# Patient Record
Sex: FEMALE | Race: WHITE | Employment: FULL TIME | URBAN - METROPOLITAN AREA
[De-identification: names, ages, dates, MRNs, and addresses within clinical notes are randomized per-mention and may not be internally consistent; named-entity substitution may affect disease eponyms.]

---

## 2017-02-27 PROCEDURE — 87480 CANDIDA DNA DIR PROBE: CPT | Performed by: OBSTETRICS & GYNECOLOGY

## 2017-02-27 PROCEDURE — 87510 GARDNER VAG DNA DIR PROBE: CPT | Performed by: OBSTETRICS & GYNECOLOGY

## 2017-02-27 PROCEDURE — 88175 CYTOPATH C/V AUTO FLUID REDO: CPT | Performed by: OBSTETRICS & GYNECOLOGY

## 2017-02-27 PROCEDURE — 87660 TRICHOMONAS VAGIN DIR PROBE: CPT | Performed by: OBSTETRICS & GYNECOLOGY

## 2017-04-27 PROCEDURE — 87480 CANDIDA DNA DIR PROBE: CPT | Performed by: OBSTETRICS & GYNECOLOGY

## 2017-04-27 PROCEDURE — 87102 FUNGUS ISOLATION CULTURE: CPT | Performed by: OBSTETRICS & GYNECOLOGY

## 2017-04-27 PROCEDURE — 87510 GARDNER VAG DNA DIR PROBE: CPT | Performed by: OBSTETRICS & GYNECOLOGY

## 2017-04-27 PROCEDURE — 87660 TRICHOMONAS VAGIN DIR PROBE: CPT | Performed by: OBSTETRICS & GYNECOLOGY

## 2017-07-05 PROCEDURE — 88305 TISSUE EXAM BY PATHOLOGIST: CPT | Performed by: OBSTETRICS & GYNECOLOGY

## 2017-07-05 PROCEDURE — 88312 SPECIAL STAINS GROUP 1: CPT | Performed by: OBSTETRICS & GYNECOLOGY

## 2018-09-07 PROBLEM — G56.22 CUBITAL TUNNEL SYNDROME ON LEFT: Status: ACTIVE | Noted: 2018-09-07

## 2018-09-07 PROBLEM — M75.102 ROTATOR CUFF SYNDROME OF LEFT SHOULDER: Status: ACTIVE | Noted: 2018-09-07

## 2018-10-01 PROBLEM — M25.512 ACUTE PAIN OF LEFT SHOULDER: Status: ACTIVE | Noted: 2018-10-01

## 2022-01-14 ENCOUNTER — HOSPITAL ENCOUNTER (OUTPATIENT)
Dept: ULTRASOUND IMAGING | Age: 52
Discharge: HOME | End: 2022-01-14
Attending: FAMILY MEDICINE
Payer: COMMERCIAL

## 2022-01-14 DIAGNOSIS — R19.05 PERIUMBILICAL MASS: ICD-10-CM

## 2022-01-14 PROCEDURE — 76705 ECHO EXAM OF ABDOMEN: CPT | Performed by: FAMILY MEDICINE

## 2022-01-14 PROCEDURE — 76700 US EXAM ABDOM COMPLETE: CPT | Performed by: FAMILY MEDICINE

## 2022-01-22 ENCOUNTER — HOSPITAL ENCOUNTER (OUTPATIENT)
Dept: MAMMOGRAPHY | Age: 52
Discharge: HOME | End: 2022-01-22
Attending: FAMILY MEDICINE
Payer: COMMERCIAL

## 2022-01-22 DIAGNOSIS — Z12.31 ENCOUNTER FOR SCREENING MAMMOGRAM FOR MALIGNANT NEOPLASM OF BREAST: ICD-10-CM

## 2022-01-22 PROCEDURE — 77063 BREAST TOMOSYNTHESIS BI: CPT | Performed by: FAMILY MEDICINE

## 2022-01-22 PROCEDURE — 77067 SCR MAMMO BI INCL CAD: CPT | Performed by: FAMILY MEDICINE

## 2022-04-07 ENCOUNTER — OFFICE VISIT (OUTPATIENT)
Dept: OBGYN CLINIC | Facility: CLINIC | Age: 52
End: 2022-04-07
Payer: COMMERCIAL

## 2022-04-07 VITALS
HEIGHT: 62.5 IN | DIASTOLIC BLOOD PRESSURE: 64 MMHG | WEIGHT: 168.81 LBS | SYSTOLIC BLOOD PRESSURE: 122 MMHG | BODY MASS INDEX: 30.29 KG/M2

## 2022-04-07 DIAGNOSIS — M62.89 PELVIC FLOOR DYSFUNCTION IN FEMALE: ICD-10-CM

## 2022-04-07 DIAGNOSIS — R35.0 URINARY FREQUENCY: ICD-10-CM

## 2022-04-07 DIAGNOSIS — N76.3 CHRONIC VULVITIS: ICD-10-CM

## 2022-04-07 DIAGNOSIS — Z01.411 ENCOUNTER FOR WELL WOMAN EXAM WITH ABNORMAL FINDINGS: Primary | ICD-10-CM

## 2022-04-07 DIAGNOSIS — D21.9 FIBROIDS: ICD-10-CM

## 2022-04-07 DIAGNOSIS — N39.3 SUI (STRESS URINARY INCONTINENCE, FEMALE): ICD-10-CM

## 2022-04-07 DIAGNOSIS — Z12.4 SCREENING FOR CERVICAL CANCER: ICD-10-CM

## 2022-04-07 DIAGNOSIS — Z12.31 ENCOUNTER FOR SCREENING MAMMOGRAM FOR MALIGNANT NEOPLASM OF BREAST: ICD-10-CM

## 2022-04-07 LAB
APPEARANCE: CLEAR
BILIRUB UR QL STRIP.AUTO: NEGATIVE
BILIRUBIN: NEGATIVE
COLOR UR AUTO: YELLOW
CYTOLOGY CVX/VAG DOC THIN PREP: NORMAL
GLUCOSE (URINE DIPSTICK): NEGATIVE MG/DL
GLUCOSE UR STRIP.AUTO-MCNC: NEGATIVE MG/DL
HPV16+18+45 E6+E7MRNA CVX NAA+PROBE: NEGATIVE
KETONES (URINE DIPSTICK): NEGATIVE MG/DL
KETONES UR STRIP.AUTO-MCNC: NEGATIVE MG/DL
LEUKOCYTE ESTERASE UR QL STRIP.AUTO: NEGATIVE
LEUKOCYTES: NEGATIVE
MULTISTIX LOT#: NORMAL NUMERIC
NITRITE UR QL STRIP.AUTO: NEGATIVE
NITRITE, URINE: NEGATIVE
OCCULT BLOOD: NEGATIVE
PH UR STRIP.AUTO: 7 [PH] (ref 5–8)
PH, URINE: 7 (ref 4.5–8)
PROT UR STRIP.AUTO-MCNC: NEGATIVE MG/DL
PROTEIN (URINE DIPSTICK): NEGATIVE MG/DL
RBC UR QL AUTO: NEGATIVE
SP GR UR STRIP.AUTO: 1.02 (ref 1–1.03)
SPECIFIC GRAVITY: 1.02 (ref 1–1.03)
URINE-COLOR: YELLOW
UROBILINOGEN UR STRIP.AUTO-MCNC: <2 MG/DL
UROBILINOGEN,SEMI-QN: 0.2 MG/DL (ref 0–1.9)

## 2022-04-07 PROCEDURE — 3078F DIAST BP <80 MM HG: CPT | Performed by: OBSTETRICS & GYNECOLOGY

## 2022-04-07 PROCEDURE — 81001 URINALYSIS AUTO W/SCOPE: CPT | Performed by: OBSTETRICS & GYNECOLOGY

## 2022-04-07 PROCEDURE — 99204 OFFICE O/P NEW MOD 45 MIN: CPT | Performed by: OBSTETRICS & GYNECOLOGY

## 2022-04-07 PROCEDURE — 87510 GARDNER VAG DNA DIR PROBE: CPT | Performed by: OBSTETRICS & GYNECOLOGY

## 2022-04-07 PROCEDURE — 81002 URINALYSIS NONAUTO W/O SCOPE: CPT | Performed by: OBSTETRICS & GYNECOLOGY

## 2022-04-07 PROCEDURE — 87624 HPV HI-RISK TYP POOLED RSLT: CPT | Performed by: OBSTETRICS & GYNECOLOGY

## 2022-04-07 PROCEDURE — 87086 URINE CULTURE/COLONY COUNT: CPT | Performed by: OBSTETRICS & GYNECOLOGY

## 2022-04-07 PROCEDURE — 87660 TRICHOMONAS VAGIN DIR PROBE: CPT | Performed by: OBSTETRICS & GYNECOLOGY

## 2022-04-07 PROCEDURE — 99386 PREV VISIT NEW AGE 40-64: CPT | Performed by: OBSTETRICS & GYNECOLOGY

## 2022-04-07 PROCEDURE — 3008F BODY MASS INDEX DOCD: CPT | Performed by: OBSTETRICS & GYNECOLOGY

## 2022-04-07 PROCEDURE — 87480 CANDIDA DNA DIR PROBE: CPT | Performed by: OBSTETRICS & GYNECOLOGY

## 2022-04-07 PROCEDURE — 3074F SYST BP LT 130 MM HG: CPT | Performed by: OBSTETRICS & GYNECOLOGY

## 2022-04-07 NOTE — PATIENT INSTRUCTIONS
Vulvar care basics:    Cleaning:  -use plain warm (not hot) water (no soap) to wash if needed or can take Sitz bath (see below)   -use water, fingers, & only very gentle, fragrance-free, moisturizing cleanser       (e.g. Cetaphil or CeraVe hydrating or gentle cleansers meant for eczema/psoriasis & faces)  -only pat dry gently (no rubbing)    Sitz baths:  -soothing and cleansing  -[facility] a S[facility] Bath from [facility] or [facility]--fits over toilet, you can fill with water to soak vulva without having to get in bathtub  -Use 1-3 times per day for ten minutes at a time  -Pat dry, apply thin layer of vaseline to protect the skin    Protection/Prevention:  -goal is to maintain an intact, moist (non-dehydrated) skin barrier   -need to prevent irritation & over-drying of skin that can lead to micro-tears, cracking, and itching, pain, & bleeding.   -do not scratch or wipe hard (mechanical injury)   -avoid strong chemicals/fragrances (fragrance free soap & detergents, avoid fabric softeners)  -avoid other irritants (e.g. sweat, leaked urine, leaked stool)   -avoid excessive friction (no thongs, always use lubricant for intercourse, daily barrier cream or ointment)   -breathable underwear, change underwear if sweaty/wet, no underwear while sleeping if possible. -DO USE a barrier product for protection        (e.g. Aquaphor, vaseline, A&D ointment, Zinc oxide, diaper rash cream) at least once daily  -DO NOT use any instrument (including fingers) in the anus first and then in the vagina. This will cause a bacterial infection of the vagina.      Treatment:  -BEST TREATMENT IS PREVENTION   -ointments are generally more potent than creams  -use just a thin layer  -during treatment (usually at least 2 wk) it is best to avoid intercourse to avoid trauma to the skin   -if diabetic or prone to yeast infections:          Start with internal (vaginal) AND external (vulvar) therapy         Monistat 7 vaginally AND Lotrimin (clotrimazole) twice daily at minimum 14-28 days          Both Monistat & Lotrimin are over the counter - talk to pharmacist if you need help         Often chronic therapy with clotrimazole or Nystatin (prescription) will be needed   -if diagnosed with chronic inflammatory skin condition (e.g. lichen sclerosis)          Most important is avoiding scratching & irritants         Work with gynecologist to form a steroid regimen for long term use         Often will need daily strong topical steroid (prescription) for 6-12 weeks. Best to try to taper down to 2-3 times per week or weekly if able & can increase use in a flare  -if sensitive/dry skin         Add back some oils &/or moisture on a regular basis         Coconut oil is pure (no irritants) & contains ceramides (fatty acids) that are              important for maintaining skin barrier         Hyaluronic acid (there are suppositories for intravaginal use e.g Revaree)         Vaginal moisturizer products can be used topically as well (e.g. Replens, Kourtney Latin)          Still recommend a barrier product in addition (on top of the above)     If you are tempted to scratch:  -place ice pack on area for 15-20 minutes & distract yourself.   -if needed can (sparingly) place a thin layer of lidocaine ointment or cream         (e.g. Bikini-zone, etc over the counter)  -can take an oral anti-histamine (e.g. Benadryl, Claritin, Zyrtec, etc)   -can also use a topical steroid (hydrocortisone ointment over the counter) for a few days        Take care - chronic steroid use can cause skin thinning & can worsen your problem. Lubricants    Aloe Cadabra  Good Clean Love  Restore  Pre-Seed (targeted for those attempting to conceive)     Please remember that if your condition is not improving, you may need a skin biopsy or   to see a dermatologist. Skin cancer can itch too, so we should always make sure we are   looking out/thinking about that as a possibility as well. Good luck!        The Role of Physical Therapy in the Treatment of Pelvic Floor Dysfunction:    Physical therapists are trained to evaluate and treat dysfunctions in the joints, muscles, nerves and scar. Physical therapists specifically trained in the area of pelvic health can identify the possible musculoskeletal causes of pelvic pain, bladder and bowel difficulties and develop a treatment plan specific to the individual suffering from this difficulties. What to expect at your first physical therapy appointment:   Your first visit will include an initial evaluation in a comfortable, private room by a therapist who has undergone advanced education and training in the evaluation and treatment of pelvic muscle dysfunction. The therapist will obtain a detailed history of your health, pain and activity limitations. She will also ask you about any bowel, bladder and sexual difficulties as these are in part controlled by the pelvic muscles. The therapist will then take a look at your posture, mobility of your spine and hips and strength and flexibility of pelvic girdle muscles. She will examine any scar tissue and trigger points in the muscles of your pelvic region. The therapist will also specifically examine the pelvic floor muscles. Your pelvic floor consists of a group of muscles that attach behind the pubic bone in the front to the tail bone in the back. They are responsible for providing support to the pelvic joints and organs, relaxing to allow the passage of urine, stool and gas and martha to prevent the loss of urine, stool and gas as appropriate. In order to best examine these muscles you will be asked to undress from the waist down and be covered with a sheet. The therapist will use a lubricated, gloved finger to identify painful muscles around and in your vagina or rectum then instruct you to contract and relax these muscles in order to determine how the muscles are functioning.  Care is taken to make you as comfortable as possible with the exam.     Your therapist will discuss the evaluation results with you and provide you with education regarding your specific condition and the expectation of therapy. She will answer all of your questions and will work with you to establish a treatment plan based on the results of the evaluation and your goals for therapy. THE [facility] Pelvic Floor Physical Therapy    [address], [address] [facility] [place] [address], [address] [facility]. Ph: [phone]  [address], [facility], 2nd floor. [facility]. Ph: [phone] & [phone]  [address], [place], [address] [facility]. Ph [phone]  [address], [address]. Ph [phone]  [address]. Ph [phone]  [address] [place] [address].  Ph [phone]

## 2022-04-08 LAB — HPV I/H RISK 1 DNA SPEC QL NAA+PROBE: NEGATIVE

## 2022-04-11 NOTE — PROGRESS NOTES
Left message to call office back for test results  Renetta Lundy been trying to reach you but unable to do so. Your lab results are normal. Because of some vaginal irritation, Dr. Kodak Bailey recommends for you can try some yeast cream or ointment over the counter (Monistat 7) for 2-4 weeks to see if this helps with symptoms but mainly need to get rid of the irritants. Avoid panty liners, this can promote irritant and bacteria growth. A barrier ointment (vaseline) can be very helpful to protect the skin from these irritants. You can use warm water or some dove soap at times, no vaginal or vulvar moisturizers, pat dry usually. Let us know that you received this message.      Let us know if you have futher questions,  BONITA Alonso

## 2022-05-04 ENCOUNTER — TELEPHONE (OUTPATIENT)
Dept: PHYSICAL THERAPY | Facility: HOSPITAL | Age: 52
End: 2022-05-04

## 2022-05-05 ENCOUNTER — TELEPHONE (OUTPATIENT)
Dept: PHYSICAL THERAPY | Facility: HOSPITAL | Age: 52
End: 2022-05-05

## 2022-05-09 ENCOUNTER — APPOINTMENT (OUTPATIENT)
Dept: PHYSICAL THERAPY | Facility: HOSPITAL | Age: 52
End: 2022-05-09
Attending: OBSTETRICS & GYNECOLOGY
Payer: COMMERCIAL

## 2022-05-16 ENCOUNTER — APPOINTMENT (OUTPATIENT)
Dept: PHYSICAL THERAPY | Facility: HOSPITAL | Age: 52
End: 2022-05-16
Attending: OBSTETRICS & GYNECOLOGY
Payer: COMMERCIAL

## 2022-05-23 ENCOUNTER — APPOINTMENT (OUTPATIENT)
Dept: PHYSICAL THERAPY | Facility: HOSPITAL | Age: 52
End: 2022-05-23
Attending: OBSTETRICS & GYNECOLOGY
Payer: COMMERCIAL

## 2022-05-24 PROBLEM — R45.851 SUICIDAL IDEATION: Status: ACTIVE | Noted: 2022-05-24

## 2022-05-24 PROBLEM — R39.15 URINARY URGENCY: Status: ACTIVE | Noted: 2022-05-24

## 2022-05-24 NOTE — TELEPHONE ENCOUNTER
Received phone call from [email or web address] officer: pt is doing fine, was watching TV, eating salad, denies suicidal thoughts or harming herself, pt feels safe. Pt feels that she needs an appointment, she needs to be seen for her menopause and hormones.

## 2022-05-24 NOTE — TELEPHONE ENCOUNTER
Left message to call office back. Pt msg relayed to [id number] [facility] and EP advised to call for wellness check. RN called [facility] police dept spoke to Teja regarding pt's message: suicidal thoughts, contact, address given, last visit, current medication. Police dept will call back with update.

## 2022-05-24 NOTE — TELEPHONE ENCOUNTER
Spoke to pt. She feels safe, does not plan to harm herself. If she has suicidal thoughts to call [phone], they are available 24/7. Denies suicidal thoughts at this time. She said it only happened twice. Denies s/s UTI, Some days she feels like she has to go to bathroom q30-60 mins. She's been watching youtube videos on pelvic floor PT. Finds it hard to follow without going to PT. Pt may try relaxing music to help relax her pelvic muscle. Will call pt with recommendation once her notes are reviewed by Dr. Consuelo Peck. Pt. Verb understanding.

## 2022-05-24 NOTE — TELEPHONE ENCOUNTER
She is describing urgency. At her visit she was talking to me about leaking urine with coughing for years. That is different. Needs to see PCP. Needs to have urine checked for urinary tract infection with PCP. Needs to do pelvic floor PT. Can consider going on an overactive bladder medication per PCP if she wants  She can also see urogynecology if she wants. She needs to see her PCP about her mood. Having suicidal thoughts is not a typical perimenopause symptom. Something else is going on.

## 2022-05-24 NOTE — TELEPHONE ENCOUNTER
From: Chica Gamez  To: Chelsie Brooks MD  Sent: 5/24/2022 12:13 PM CDT  Subject: Urinary Incontinence     I have not done my pelvic floor PT yet because my insurance doesn't cover it, so I am looking at extra insurance that would start June 1st. I just want you to know of my extra symptoms. So this urinary incontinence you spoke to me has gotten worse. It's awful. I have to pee every hour it seems. I know one night I woke up every hour to use the bathroom. I certainly hope that PT will help that because I can't live like this. Which brings me to my other symptom of suicidal thoughts. I have had two times after our visit of suicidal thoughts. I have not had those since getting over my ex .    Thanks,  [place]

## 2022-05-26 NOTE — TELEPHONE ENCOUNTER
LM to call back or leave Expert TA message. Third call attempt made, Letter sent through mail [phone].

## 2022-06-01 ENCOUNTER — APPOINTMENT (OUTPATIENT)
Dept: PHYSICAL THERAPY | Facility: HOSPITAL | Age: 52
End: 2022-06-01
Attending: OBSTETRICS & GYNECOLOGY
Payer: COMMERCIAL

## 2022-06-06 ENCOUNTER — APPOINTMENT (OUTPATIENT)
Dept: PHYSICAL THERAPY | Facility: HOSPITAL | Age: 52
End: 2022-06-06
Attending: OBSTETRICS & GYNECOLOGY
Payer: COMMERCIAL

## 2022-06-13 ENCOUNTER — APPOINTMENT (OUTPATIENT)
Dept: PHYSICAL THERAPY | Facility: HOSPITAL | Age: 52
End: 2022-06-13
Attending: OBSTETRICS & GYNECOLOGY
Payer: COMMERCIAL

## 2022-06-20 ENCOUNTER — APPOINTMENT (OUTPATIENT)
Dept: PHYSICAL THERAPY | Facility: HOSPITAL | Age: 52
End: 2022-06-20
Attending: OBSTETRICS & GYNECOLOGY
Payer: COMMERCIAL

## 2022-06-27 ENCOUNTER — APPOINTMENT (OUTPATIENT)
Dept: PHYSICAL THERAPY | Facility: HOSPITAL | Age: 52
End: 2022-06-27
Attending: OBSTETRICS & GYNECOLOGY
Payer: COMMERCIAL

## 2022-07-06 ENCOUNTER — APPOINTMENT (OUTPATIENT)
Dept: PHYSICAL THERAPY | Facility: HOSPITAL | Age: 52
End: 2022-07-06
Attending: OBSTETRICS & GYNECOLOGY
Payer: COMMERCIAL

## 2022-07-11 ENCOUNTER — APPOINTMENT (OUTPATIENT)
Dept: PHYSICAL THERAPY | Facility: HOSPITAL | Age: 52
End: 2022-07-11
Attending: OBSTETRICS & GYNECOLOGY
Payer: COMMERCIAL

## 2022-08-22 ENCOUNTER — TELEPHONE (OUTPATIENT)
Dept: PHYSICAL THERAPY | Facility: HOSPITAL | Age: 52
End: 2022-08-22

## 2022-09-09 ENCOUNTER — HOSPITAL ENCOUNTER (OUTPATIENT)
Dept: ULTRASOUND IMAGING | Age: 52
Discharge: HOME | End: 2022-09-09
Attending: OBSTETRICS & GYNECOLOGY
Payer: COMMERCIAL

## 2022-09-09 DIAGNOSIS — Z12.31 ENCOUNTER FOR SCREENING MAMMOGRAM FOR MALIGNANT NEOPLASM OF BREAST: ICD-10-CM

## 2022-09-09 PROCEDURE — 76856 US EXAM PELVIC COMPLETE: CPT | Performed by: OBSTETRICS & GYNECOLOGY

## 2022-09-09 PROCEDURE — 76830 TRANSVAGINAL US NON-OB: CPT | Performed by: OBSTETRICS & GYNECOLOGY

## 2022-09-16 ENCOUNTER — TELEPHONE (OUTPATIENT)
Dept: OBGYN CLINIC | Facility: CLINIC | Age: 52
End: 2022-09-16

## 2022-10-12 ENCOUNTER — TELEPHONE (OUTPATIENT)
Dept: PHYSICAL THERAPY | Facility: HOSPITAL | Age: 52
End: 2022-10-12

## 2022-10-13 ENCOUNTER — OFFICE VISIT (OUTPATIENT)
Dept: PHYSICAL THERAPY | Facility: HOSPITAL | Age: 52
End: 2022-10-13
Attending: OBSTETRICS & GYNECOLOGY
Payer: COMMERCIAL

## 2022-10-13 DIAGNOSIS — R35.0 URINARY FREQUENCY: ICD-10-CM

## 2022-10-13 DIAGNOSIS — N39.3 SUI (STRESS URINARY INCONTINENCE, FEMALE): ICD-10-CM

## 2022-10-13 DIAGNOSIS — M62.89 PELVIC FLOOR DYSFUNCTION IN FEMALE: ICD-10-CM

## 2022-10-13 PROCEDURE — 97112 NEUROMUSCULAR REEDUCATION: CPT

## 2022-10-13 PROCEDURE — 97161 PT EVAL LOW COMPLEX 20 MIN: CPT

## 2022-10-13 PROCEDURE — 97110 THERAPEUTIC EXERCISES: CPT

## 2022-10-18 ENCOUNTER — OFFICE VISIT (OUTPATIENT)
Dept: PHYSICAL THERAPY | Facility: HOSPITAL | Age: 52
End: 2022-10-18
Attending: OBSTETRICS & GYNECOLOGY
Payer: COMMERCIAL

## 2022-10-18 DIAGNOSIS — N39.3 SUI (STRESS URINARY INCONTINENCE, FEMALE): ICD-10-CM

## 2022-10-18 DIAGNOSIS — M62.89 PELVIC FLOOR DYSFUNCTION IN FEMALE: ICD-10-CM

## 2022-10-18 DIAGNOSIS — R35.0 URINARY FREQUENCY: ICD-10-CM

## 2022-10-18 PROCEDURE — 97110 THERAPEUTIC EXERCISES: CPT

## 2022-10-18 PROCEDURE — 97112 NEUROMUSCULAR REEDUCATION: CPT

## 2022-10-26 ENCOUNTER — OFFICE VISIT (OUTPATIENT)
Dept: PHYSICAL THERAPY | Facility: HOSPITAL | Age: 52
End: 2022-10-26
Attending: OBSTETRICS & GYNECOLOGY
Payer: COMMERCIAL

## 2022-10-26 DIAGNOSIS — N39.3 SUI (STRESS URINARY INCONTINENCE, FEMALE): ICD-10-CM

## 2022-10-26 DIAGNOSIS — M62.89 PELVIC FLOOR DYSFUNCTION IN FEMALE: ICD-10-CM

## 2022-10-26 DIAGNOSIS — R35.0 URINARY FREQUENCY: ICD-10-CM

## 2022-10-26 PROCEDURE — 97112 NEUROMUSCULAR REEDUCATION: CPT

## 2022-10-26 PROCEDURE — 97110 THERAPEUTIC EXERCISES: CPT

## 2022-10-31 ENCOUNTER — OFFICE VISIT (OUTPATIENT)
Dept: PHYSICAL THERAPY | Facility: HOSPITAL | Age: 52
End: 2022-10-31
Attending: OBSTETRICS & GYNECOLOGY
Payer: COMMERCIAL

## 2022-10-31 DIAGNOSIS — R35.0 URINARY FREQUENCY: ICD-10-CM

## 2022-10-31 DIAGNOSIS — M62.89 PELVIC FLOOR DYSFUNCTION IN FEMALE: ICD-10-CM

## 2022-10-31 DIAGNOSIS — N39.3 SUI (STRESS URINARY INCONTINENCE, FEMALE): ICD-10-CM

## 2022-10-31 PROCEDURE — 97112 NEUROMUSCULAR REEDUCATION: CPT

## 2022-10-31 PROCEDURE — 97110 THERAPEUTIC EXERCISES: CPT

## 2022-11-09 ENCOUNTER — OFFICE VISIT (OUTPATIENT)
Dept: PHYSICAL THERAPY | Facility: HOSPITAL | Age: 52
End: 2022-11-09
Attending: OBSTETRICS & GYNECOLOGY
Payer: COMMERCIAL

## 2022-11-09 DIAGNOSIS — M62.89 PELVIC FLOOR DYSFUNCTION IN FEMALE: ICD-10-CM

## 2022-11-09 DIAGNOSIS — R35.0 URINARY FREQUENCY: ICD-10-CM

## 2022-11-09 DIAGNOSIS — N39.3 SUI (STRESS URINARY INCONTINENCE, FEMALE): ICD-10-CM

## 2022-11-09 PROCEDURE — 97112 NEUROMUSCULAR REEDUCATION: CPT

## 2022-11-09 PROCEDURE — 97110 THERAPEUTIC EXERCISES: CPT

## 2022-11-16 ENCOUNTER — APPOINTMENT (OUTPATIENT)
Dept: PHYSICAL THERAPY | Facility: HOSPITAL | Age: 52
End: 2022-11-16
Attending: OBSTETRICS & GYNECOLOGY
Payer: COMMERCIAL

## 2022-11-23 ENCOUNTER — OFFICE VISIT (OUTPATIENT)
Dept: PHYSICAL THERAPY | Facility: HOSPITAL | Age: 52
End: 2022-11-23
Attending: OBSTETRICS & GYNECOLOGY
Payer: COMMERCIAL

## 2022-11-23 PROCEDURE — 97112 NEUROMUSCULAR REEDUCATION: CPT

## 2022-11-23 PROCEDURE — 97110 THERAPEUTIC EXERCISES: CPT

## 2022-11-30 ENCOUNTER — OFFICE VISIT (OUTPATIENT)
Dept: PHYSICAL THERAPY | Facility: HOSPITAL | Age: 52
End: 2022-11-30
Attending: OBSTETRICS & GYNECOLOGY
Payer: COMMERCIAL

## 2022-11-30 DIAGNOSIS — M62.89 PELVIC FLOOR DYSFUNCTION IN FEMALE: ICD-10-CM

## 2022-11-30 DIAGNOSIS — R35.0 URINARY FREQUENCY: ICD-10-CM

## 2022-11-30 DIAGNOSIS — N39.3 SUI (STRESS URINARY INCONTINENCE, FEMALE): ICD-10-CM

## 2022-11-30 PROCEDURE — 97112 NEUROMUSCULAR REEDUCATION: CPT

## 2022-11-30 PROCEDURE — 97110 THERAPEUTIC EXERCISES: CPT

## 2022-12-07 ENCOUNTER — OFFICE VISIT (OUTPATIENT)
Dept: PHYSICAL THERAPY | Facility: HOSPITAL | Age: 52
End: 2022-12-07
Attending: OBSTETRICS & GYNECOLOGY
Payer: COMMERCIAL

## 2022-12-07 DIAGNOSIS — R35.0 URINARY FREQUENCY: ICD-10-CM

## 2022-12-07 DIAGNOSIS — M62.89 PELVIC FLOOR DYSFUNCTION IN FEMALE: ICD-10-CM

## 2022-12-07 DIAGNOSIS — N39.3 SUI (STRESS URINARY INCONTINENCE, FEMALE): ICD-10-CM

## 2022-12-07 PROCEDURE — 97110 THERAPEUTIC EXERCISES: CPT

## 2022-12-07 PROCEDURE — 97112 NEUROMUSCULAR REEDUCATION: CPT

## 2022-12-08 ENCOUNTER — LAB ENCOUNTER (OUTPATIENT)
Dept: LAB | Age: 52
End: 2022-12-08
Attending: FAMILY MEDICINE
Payer: COMMERCIAL

## 2022-12-08 DIAGNOSIS — Z00.00 ROUTINE GENERAL MEDICAL EXAMINATION AT A HEALTH CARE FACILITY: Primary | ICD-10-CM

## 2022-12-08 LAB
ALBUMIN SERPL-MCNC: 3.8 G/DL (ref 3.4–5)
ALBUMIN/GLOB SERPL: 1.2 {RATIO} (ref 1–2)
ALP LIVER SERPL-CCNC: 51 U/L
ALT SERPL-CCNC: 23 U/L
ANION GAP SERPL CALC-SCNC: 5 MMOL/L (ref 0–18)
AST SERPL-CCNC: 23 U/L (ref 15–37)
BASOPHILS # BLD AUTO: 0.02 X10(3) UL (ref 0–0.2)
BASOPHILS NFR BLD AUTO: 0.3 %
BILIRUB SERPL-MCNC: 0.7 MG/DL (ref 0.1–2)
BILIRUB UR QL STRIP.AUTO: NEGATIVE
BUN BLD-MCNC: 10 MG/DL (ref 7–18)
CALCIUM BLD-MCNC: 8.8 MG/DL (ref 8.5–10.1)
CHLORIDE SERPL-SCNC: 109 MMOL/L (ref 98–112)
CHOLEST SERPL-MCNC: 211 MG/DL (ref ?–200)
CLARITY UR REFRACT.AUTO: CLEAR
CO2 SERPL-SCNC: 25 MMOL/L (ref 21–32)
COLOR UR AUTO: YELLOW
CREAT BLD-MCNC: 0.77 MG/DL
EOSINOPHIL # BLD AUTO: 0.08 X10(3) UL (ref 0–0.7)
EOSINOPHIL NFR BLD AUTO: 1.3 %
ERYTHROCYTE [DISTWIDTH] IN BLOOD BY AUTOMATED COUNT: 12.7 %
EST. AVERAGE GLUCOSE BLD GHB EST-MCNC: 114 MG/DL (ref 68–126)
FASTING PATIENT LIPID ANSWER: YES
FASTING STATUS PATIENT QL REPORTED: YES
GFR SERPLBLD BASED ON 1.73 SQ M-ARVRAT: 94 ML/MIN/1.73M2 (ref 60–?)
GLOBULIN PLAS-MCNC: 3.1 G/DL (ref 2.8–4.4)
GLUCOSE BLD-MCNC: 87 MG/DL (ref 70–99)
GLUCOSE UR STRIP.AUTO-MCNC: NEGATIVE MG/DL
HBA1C MFR BLD: 5.6 % (ref ?–5.7)
HCT VFR BLD AUTO: 40 %
HDLC SERPL-MCNC: 72 MG/DL (ref 40–59)
HGB BLD-MCNC: 13.3 G/DL
IMM GRANULOCYTES # BLD AUTO: 0.02 X10(3) UL (ref 0–1)
IMM GRANULOCYTES NFR BLD: 0.3 %
KETONES UR STRIP.AUTO-MCNC: NEGATIVE MG/DL
LDLC SERPL CALC-MCNC: 121 MG/DL (ref ?–100)
LEUKOCYTE ESTERASE UR QL STRIP.AUTO: NEGATIVE
LYMPHOCYTES # BLD AUTO: 2.93 X10(3) UL (ref 1–4)
LYMPHOCYTES NFR BLD AUTO: 48.6 %
MCH RBC QN AUTO: 31.7 PG (ref 26–34)
MCHC RBC AUTO-ENTMCNC: 33.3 G/DL (ref 31–37)
MCV RBC AUTO: 95.5 FL
MONOCYTES # BLD AUTO: 0.68 X10(3) UL (ref 0.1–1)
MONOCYTES NFR BLD AUTO: 11.3 %
NEUTROPHILS # BLD AUTO: 2.3 X10 (3) UL (ref 1.5–7.7)
NEUTROPHILS # BLD AUTO: 2.3 X10(3) UL (ref 1.5–7.7)
NEUTROPHILS NFR BLD AUTO: 38.2 %
NITRITE UR QL STRIP.AUTO: NEGATIVE
NONHDLC SERPL-MCNC: 139 MG/DL (ref ?–130)
OSMOLALITY SERPL CALC.SUM OF ELEC: 286 MOSM/KG (ref 275–295)
PH UR STRIP.AUTO: 6.5 [PH] (ref 5–8)
PLATELET # BLD AUTO: 289 10(3)UL (ref 150–450)
POTASSIUM SERPL-SCNC: 4.6 MMOL/L (ref 3.5–5.1)
PROT SERPL-MCNC: 6.9 G/DL (ref 6.4–8.2)
PROT UR STRIP.AUTO-MCNC: NEGATIVE MG/DL
RBC # BLD AUTO: 4.19 X10(6)UL
RBC UR QL AUTO: NEGATIVE
SODIUM SERPL-SCNC: 139 MMOL/L (ref 136–145)
SP GR UR STRIP.AUTO: 1.02 (ref 1–1.03)
TRIGL SERPL-MCNC: 103 MG/DL (ref 30–149)
TSI SER-ACNC: 1.38 MIU/ML (ref 0.36–3.74)
URATE SERPL-MCNC: 4.1 MG/DL
UROBILINOGEN UR STRIP.AUTO-MCNC: 0.2 MG/DL
VLDLC SERPL CALC-MCNC: 18 MG/DL (ref 0–30)
WBC # BLD AUTO: 6 X10(3) UL (ref 4–11)

## 2022-12-08 PROCEDURE — 80053 COMPREHEN METABOLIC PANEL: CPT

## 2022-12-08 PROCEDURE — 81003 URINALYSIS AUTO W/O SCOPE: CPT

## 2022-12-08 PROCEDURE — 84550 ASSAY OF BLOOD/URIC ACID: CPT

## 2022-12-08 PROCEDURE — 84443 ASSAY THYROID STIM HORMONE: CPT

## 2022-12-08 PROCEDURE — 80061 LIPID PANEL: CPT

## 2022-12-08 PROCEDURE — 36415 COLL VENOUS BLD VENIPUNCTURE: CPT

## 2022-12-08 PROCEDURE — 83036 HEMOGLOBIN GLYCOSYLATED A1C: CPT

## 2022-12-08 PROCEDURE — 85025 COMPLETE CBC W/AUTO DIFF WBC: CPT

## 2022-12-14 ENCOUNTER — OFFICE VISIT (OUTPATIENT)
Dept: PHYSICAL THERAPY | Facility: HOSPITAL | Age: 52
End: 2022-12-14
Attending: OBSTETRICS & GYNECOLOGY
Payer: COMMERCIAL

## 2022-12-14 DIAGNOSIS — M62.89 PELVIC FLOOR DYSFUNCTION IN FEMALE: ICD-10-CM

## 2022-12-14 DIAGNOSIS — R35.0 URINARY FREQUENCY: ICD-10-CM

## 2022-12-14 DIAGNOSIS — N39.3 SUI (STRESS URINARY INCONTINENCE, FEMALE): ICD-10-CM

## 2022-12-14 PROCEDURE — 97112 NEUROMUSCULAR REEDUCATION: CPT

## 2022-12-14 PROCEDURE — 97110 THERAPEUTIC EXERCISES: CPT

## 2022-12-21 ENCOUNTER — APPOINTMENT (OUTPATIENT)
Dept: PHYSICAL THERAPY | Facility: HOSPITAL | Age: 52
End: 2022-12-21
Attending: OBSTETRICS & GYNECOLOGY
Payer: COMMERCIAL

## 2023-01-10 ENCOUNTER — OFFICE VISIT (OUTPATIENT)
Dept: PHYSICAL THERAPY | Facility: HOSPITAL | Age: 53
End: 2023-01-10
Attending: OBSTETRICS & GYNECOLOGY
Payer: COMMERCIAL

## 2023-01-10 PROCEDURE — 97112 NEUROMUSCULAR REEDUCATION: CPT

## 2023-01-10 PROCEDURE — 97110 THERAPEUTIC EXERCISES: CPT

## 2023-03-01 ENCOUNTER — APPOINTMENT (OUTPATIENT)
Dept: PHYSICAL THERAPY | Facility: HOSPITAL | Age: 53
End: 2023-03-01
Attending: OBSTETRICS & GYNECOLOGY
Payer: COMMERCIAL

## 2023-03-14 ENCOUNTER — HOSPITAL ENCOUNTER (OUTPATIENT)
Dept: MAMMOGRAPHY | Age: 53
Discharge: HOME | End: 2023-03-14
Attending: OBSTETRICS & GYNECOLOGY
Payer: COMMERCIAL

## 2023-03-14 DIAGNOSIS — Z12.31 ENCOUNTER FOR SCREENING MAMMOGRAM FOR MALIGNANT NEOPLASM OF BREAST: ICD-10-CM

## 2023-03-14 LAB — HM MAMMOGRAPHY BILATERAL: NORMAL

## 2023-03-14 PROCEDURE — 77067 SCR MAMMO BI INCL CAD: CPT | Performed by: OBSTETRICS & GYNECOLOGY

## 2023-03-14 PROCEDURE — 77063 BREAST TOMOSYNTHESIS BI: CPT | Performed by: OBSTETRICS & GYNECOLOGY

## 2023-03-17 NOTE — PROGRESS NOTES
Message left to call back. I also let her know that Dr. Ree Corea sent her a [employer] message with results & recommendations.

## 2023-03-20 ENCOUNTER — TELEPHONE (OUTPATIENT)
Facility: CLINIC | Age: 53
End: 2023-03-20

## 2023-07-26 ENCOUNTER — CLINICAL ABSTRACT (OUTPATIENT)
Dept: FAMILY MEDICINE | Age: 53
End: 2023-07-26

## 2023-07-26 ENCOUNTER — OFFICE VISIT (OUTPATIENT)
Dept: FAMILY MEDICINE | Age: 53
End: 2023-07-26

## 2023-07-26 VITALS
RESPIRATION RATE: 16 BRPM | TEMPERATURE: 98.3 F | HEART RATE: 92 BPM | DIASTOLIC BLOOD PRESSURE: 93 MMHG | HEIGHT: 63 IN | SYSTOLIC BLOOD PRESSURE: 153 MMHG | BODY MASS INDEX: 31.21 KG/M2 | WEIGHT: 176.15 LBS

## 2023-07-26 DIAGNOSIS — E78.2 MIXED HYPERLIPIDEMIA: ICD-10-CM

## 2023-07-26 DIAGNOSIS — Z00.01 ENCOUNTER FOR GENERAL ADULT MEDICAL EXAMINATION WITH ABNORMAL FINDINGS: Primary | ICD-10-CM

## 2023-07-26 DIAGNOSIS — Z12.11 SCREEN FOR COLON CANCER: ICD-10-CM

## 2023-07-26 DIAGNOSIS — Z23 NEED FOR SHINGLES VACCINE: ICD-10-CM

## 2023-07-26 DIAGNOSIS — E03.9 HYPOTHYROIDISM, UNSPECIFIED TYPE: ICD-10-CM

## 2023-07-26 DIAGNOSIS — Z00.00 LABORATORY EXAM ORDERED AS PART OF ROUTINE GENERAL MEDICAL EXAMINATION: ICD-10-CM

## 2023-07-26 DIAGNOSIS — M77.12 LATERAL EPICONDYLITIS OF BOTH ELBOWS: ICD-10-CM

## 2023-07-26 DIAGNOSIS — I10 PRIMARY HYPERTENSION: ICD-10-CM

## 2023-07-26 DIAGNOSIS — M77.11 LATERAL EPICONDYLITIS OF BOTH ELBOWS: ICD-10-CM

## 2023-07-26 PROBLEM — N39.3 SUI (STRESS URINARY INCONTINENCE, FEMALE): Status: ACTIVE | Noted: 2022-04-07

## 2023-07-26 PROCEDURE — 3077F SYST BP >= 140 MM HG: CPT | Performed by: FAMILY MEDICINE

## 2023-07-26 PROCEDURE — 99386 PREV VISIT NEW AGE 40-64: CPT | Performed by: FAMILY MEDICINE

## 2023-07-26 PROCEDURE — 3080F DIAST BP >= 90 MM HG: CPT | Performed by: FAMILY MEDICINE

## 2023-07-26 PROCEDURE — 99214 OFFICE O/P EST MOD 30 MIN: CPT | Performed by: FAMILY MEDICINE

## 2023-07-26 RX ORDER — LOSARTAN POTASSIUM 100 MG/1
100 TABLET ORAL DAILY
Qty: 30 TABLET | Refills: 0 | Status: SHIPPED | OUTPATIENT
Start: 2023-07-26

## 2023-07-26 RX ORDER — LOVASTATIN 20 MG/1
20 TABLET ORAL DAILY
Qty: 90 TABLET | Refills: 1 | Status: SHIPPED | OUTPATIENT
Start: 2023-07-26

## 2023-07-26 RX ORDER — LOSARTAN POTASSIUM 50 MG/1
50 TABLET ORAL DAILY
COMMUNITY
Start: 2023-04-27 | End: 2023-07-26 | Stop reason: DRUGHIGH

## 2023-07-26 RX ORDER — LOSARTAN POTASSIUM 50 MG/1
50 TABLET ORAL DAILY
Qty: 90 TABLET | Refills: 0 | Status: CANCELLED | OUTPATIENT
Start: 2023-07-26

## 2023-07-26 RX ORDER — LEVOTHYROXINE SODIUM 0.12 MG/1
125 TABLET ORAL DAILY
COMMUNITY
Start: 2023-07-12 | End: 2023-07-26 | Stop reason: SDUPTHER

## 2023-07-26 RX ORDER — LOVASTATIN 20 MG/1
20 TABLET ORAL DAILY
COMMUNITY
Start: 2023-06-05 | End: 2023-07-26 | Stop reason: SDUPTHER

## 2023-07-26 RX ORDER — LOSARTAN POTASSIUM 100 MG/1
100 TABLET ORAL DAILY
Qty: 90 TABLET | OUTPATIENT
Start: 2023-07-26

## 2023-07-26 RX ORDER — LEVOTHYROXINE SODIUM 0.12 MG/1
125 TABLET ORAL DAILY
Qty: 90 TABLET | Refills: 1 | Status: SHIPPED | OUTPATIENT
Start: 2023-07-26

## 2023-07-26 ASSESSMENT — PATIENT HEALTH QUESTIONNAIRE - PHQ9
SUM OF ALL RESPONSES TO PHQ9 QUESTIONS 1 AND 2: 0
2. FEELING DOWN, DEPRESSED OR HOPELESS: NOT AT ALL
SUM OF ALL RESPONSES TO PHQ9 QUESTIONS 1 AND 2: 0
1. LITTLE INTEREST OR PLEASURE IN DOING THINGS: NOT AT ALL
CLINICAL INTERPRETATION OF PHQ2 SCORE: NO FURTHER SCREENING NEEDED

## 2023-07-31 PROBLEM — M25.522 BILATERAL ELBOW JOINT PAIN: Status: ACTIVE | Noted: 2023-07-31

## 2023-07-31 PROBLEM — M25.521 BILATERAL ELBOW JOINT PAIN: Status: ACTIVE | Noted: 2023-07-31

## 2023-07-31 ASSESSMENT — ENCOUNTER SYMPTOMS
QUALITY: DISCOMFORT
QUALITY: ACHE

## 2023-08-01 ENCOUNTER — OFFICE VISIT (OUTPATIENT)
Dept: OCCUPATIONAL THERAPY | Age: 53
End: 2023-08-01
Attending: FAMILY MEDICINE

## 2023-08-01 DIAGNOSIS — M77.12 LATERAL EPICONDYLITIS OF BOTH ELBOWS: ICD-10-CM

## 2023-08-01 DIAGNOSIS — M77.11 LATERAL EPICONDYLITIS OF BOTH ELBOWS: ICD-10-CM

## 2023-08-01 PROCEDURE — 97165 OT EVAL LOW COMPLEX 30 MIN: CPT | Performed by: OCCUPATIONAL THERAPIST

## 2023-08-01 PROCEDURE — 97110 THERAPEUTIC EXERCISES: CPT | Performed by: OCCUPATIONAL THERAPIST

## 2023-08-01 PROCEDURE — 97140 MANUAL THERAPY 1/> REGIONS: CPT | Performed by: OCCUPATIONAL THERAPIST

## 2023-08-01 ASSESSMENT — ENCOUNTER SYMPTOMS
PAIN SCALE AT LOWEST: 0
ALLEVIATING FACTORS: ICE
ALLEVIATING FACTORS: AVOIDING MOVEMENT IN INVOLVED AREA
PAIN SCALE AT HIGHEST: 7
ALLEVIATING FACTORS: REST
PAIN SEVERITY NOW: 0
ALLEVIATING FACTOR: TEMPORARY RELIEF
PAIN FREQUENCY: INTERMITTENT

## 2023-08-11 ENCOUNTER — OFFICE VISIT (OUTPATIENT)
Dept: OCCUPATIONAL THERAPY | Age: 53
End: 2023-08-11
Attending: FAMILY MEDICINE

## 2023-08-11 DIAGNOSIS — M25.521 BILATERAL ELBOW JOINT PAIN: Primary | ICD-10-CM

## 2023-08-11 DIAGNOSIS — M25.522 BILATERAL ELBOW JOINT PAIN: Primary | ICD-10-CM

## 2023-08-11 PROCEDURE — 97110 THERAPEUTIC EXERCISES: CPT | Performed by: OCCUPATIONAL THERAPIST

## 2023-08-11 PROCEDURE — 97112 NEUROMUSCULAR REEDUCATION: CPT | Performed by: OCCUPATIONAL THERAPIST

## 2023-08-11 PROCEDURE — 97140 MANUAL THERAPY 1/> REGIONS: CPT | Performed by: OCCUPATIONAL THERAPIST

## 2023-08-11 PROCEDURE — 97530 THERAPEUTIC ACTIVITIES: CPT | Performed by: OCCUPATIONAL THERAPIST

## 2023-08-18 ENCOUNTER — OFFICE VISIT (OUTPATIENT)
Dept: OCCUPATIONAL THERAPY | Age: 53
End: 2023-08-18
Attending: FAMILY MEDICINE

## 2023-08-18 DIAGNOSIS — M25.522 BILATERAL ELBOW JOINT PAIN: Primary | ICD-10-CM

## 2023-08-18 DIAGNOSIS — M25.521 BILATERAL ELBOW JOINT PAIN: Primary | ICD-10-CM

## 2023-08-18 PROCEDURE — 97110 THERAPEUTIC EXERCISES: CPT | Performed by: OCCUPATIONAL THERAPIST

## 2023-08-18 PROCEDURE — 97530 THERAPEUTIC ACTIVITIES: CPT | Performed by: OCCUPATIONAL THERAPIST

## 2023-08-18 PROCEDURE — 97140 MANUAL THERAPY 1/> REGIONS: CPT | Performed by: OCCUPATIONAL THERAPIST

## 2023-08-18 PROCEDURE — 97112 NEUROMUSCULAR REEDUCATION: CPT | Performed by: OCCUPATIONAL THERAPIST

## 2023-08-23 ENCOUNTER — APPOINTMENT (OUTPATIENT)
Dept: FAMILY MEDICINE | Age: 53
End: 2023-08-23

## 2023-08-25 ENCOUNTER — OFFICE VISIT (OUTPATIENT)
Dept: OCCUPATIONAL THERAPY | Age: 53
End: 2023-08-25
Attending: FAMILY MEDICINE

## 2023-08-25 DIAGNOSIS — M25.521 BILATERAL ELBOW JOINT PAIN: Primary | ICD-10-CM

## 2023-08-25 DIAGNOSIS — M25.522 BILATERAL ELBOW JOINT PAIN: Primary | ICD-10-CM

## 2023-08-25 PROCEDURE — 97110 THERAPEUTIC EXERCISES: CPT | Performed by: OCCUPATIONAL THERAPIST

## 2023-08-25 PROCEDURE — 97140 MANUAL THERAPY 1/> REGIONS: CPT | Performed by: OCCUPATIONAL THERAPIST

## 2023-08-25 PROCEDURE — 97530 THERAPEUTIC ACTIVITIES: CPT | Performed by: OCCUPATIONAL THERAPIST

## 2023-08-25 PROCEDURE — 97112 NEUROMUSCULAR REEDUCATION: CPT | Performed by: OCCUPATIONAL THERAPIST

## 2023-08-31 ENCOUNTER — OFFICE VISIT (OUTPATIENT)
Dept: FAMILY MEDICINE CLINIC | Facility: CLINIC | Age: 53
End: 2023-08-31
Payer: COMMERCIAL

## 2023-08-31 VITALS
HEART RATE: 95 BPM | SYSTOLIC BLOOD PRESSURE: 140 MMHG | OXYGEN SATURATION: 97 % | DIASTOLIC BLOOD PRESSURE: 90 MMHG | RESPIRATION RATE: 16 BRPM | BODY MASS INDEX: 30.86 KG/M2 | WEIGHT: 172 LBS | HEIGHT: 62.5 IN

## 2023-08-31 DIAGNOSIS — E03.8 OTHER SPECIFIED HYPOTHYROIDISM: ICD-10-CM

## 2023-08-31 DIAGNOSIS — E78.2 MIXED HYPERLIPIDEMIA: ICD-10-CM

## 2023-08-31 DIAGNOSIS — I10 PRIMARY HYPERTENSION: Primary | ICD-10-CM

## 2023-08-31 DIAGNOSIS — Z23 NEED FOR VACCINATION: ICD-10-CM

## 2023-08-31 DIAGNOSIS — Z12.11 SCREENING FOR COLON CANCER: ICD-10-CM

## 2023-08-31 PROBLEM — M25.522 BILATERAL ELBOW JOINT PAIN: Status: ACTIVE | Noted: 2023-07-31

## 2023-08-31 PROBLEM — M25.521 BILATERAL ELBOW JOINT PAIN: Status: ACTIVE | Noted: 2023-07-31

## 2023-08-31 RX ORDER — LOSARTAN POTASSIUM 100 MG/1
100 TABLET ORAL DAILY
COMMUNITY
Start: 2023-07-26 | End: 2023-08-31

## 2023-08-31 RX ORDER — LOVASTATIN 20 MG/1
20 TABLET ORAL DAILY
Qty: 90 TABLET | Refills: 1 | Status: SHIPPED | OUTPATIENT
Start: 2023-08-31

## 2023-08-31 RX ORDER — LEVOTHYROXINE SODIUM 0.12 MG/1
TABLET ORAL
COMMUNITY

## 2023-08-31 RX ORDER — LOSARTAN POTASSIUM 100 MG/1
100 TABLET ORAL DAILY
Qty: 90 TABLET | Refills: 1 | Status: SHIPPED | OUTPATIENT
Start: 2023-08-31

## 2023-08-31 RX ORDER — AMLODIPINE BESYLATE 5 MG/1
5 TABLET ORAL DAILY
Qty: 30 TABLET | Refills: 0 | Status: SHIPPED | OUTPATIENT
Start: 2023-08-31

## 2023-09-06 ENCOUNTER — OFFICE VISIT (OUTPATIENT)
Dept: OCCUPATIONAL THERAPY | Age: 53
End: 2023-09-06
Attending: FAMILY MEDICINE

## 2023-09-06 DIAGNOSIS — M25.521 BILATERAL ELBOW JOINT PAIN: Primary | ICD-10-CM

## 2023-09-06 DIAGNOSIS — M25.522 BILATERAL ELBOW JOINT PAIN: Primary | ICD-10-CM

## 2023-09-06 PROCEDURE — 97112 NEUROMUSCULAR REEDUCATION: CPT | Performed by: OCCUPATIONAL THERAPIST

## 2023-09-06 PROCEDURE — 97110 THERAPEUTIC EXERCISES: CPT | Performed by: OCCUPATIONAL THERAPIST

## 2023-09-06 PROCEDURE — 97140 MANUAL THERAPY 1/> REGIONS: CPT | Performed by: OCCUPATIONAL THERAPIST

## 2023-09-06 PROCEDURE — 97530 THERAPEUTIC ACTIVITIES: CPT | Performed by: OCCUPATIONAL THERAPIST

## 2023-09-27 ENCOUNTER — TELEPHONE (OUTPATIENT)
Dept: FAMILY MEDICINE CLINIC | Facility: CLINIC | Age: 53
End: 2023-09-27

## 2023-10-06 ENCOUNTER — LABORATORY ENCOUNTER (OUTPATIENT)
Dept: LAB | Age: 53
End: 2023-10-06
Attending: STUDENT IN AN ORGANIZED HEALTH CARE EDUCATION/TRAINING PROGRAM
Payer: COMMERCIAL

## 2023-10-06 LAB
ANION GAP SERPL CALC-SCNC: 4 MMOL/L (ref 0–18)
BUN BLD-MCNC: 14 MG/DL (ref 7–18)
CALCIUM BLD-MCNC: 9 MG/DL (ref 8.5–10.1)
CHLORIDE SERPL-SCNC: 104 MMOL/L (ref 98–112)
CHOLEST SERPL-MCNC: 162 MG/DL (ref ?–200)
CO2 SERPL-SCNC: 27 MMOL/L (ref 21–32)
CREAT BLD-MCNC: 0.74 MG/DL
EGFRCR SERPLBLD CKD-EPI 2021: 98 ML/MIN/1.73M2 (ref 60–?)
FASTING PATIENT LIPID ANSWER: YES
FASTING STATUS PATIENT QL REPORTED: YES
GLUCOSE BLD-MCNC: 90 MG/DL (ref 70–99)
HDLC SERPL-MCNC: 65 MG/DL (ref 40–59)
LDLC SERPL CALC-MCNC: 81 MG/DL (ref ?–100)
NONHDLC SERPL-MCNC: 97 MG/DL (ref ?–130)
OSMOLALITY SERPL CALC.SUM OF ELEC: 280 MOSM/KG (ref 275–295)
POTASSIUM SERPL-SCNC: 3.8 MMOL/L (ref 3.5–5.1)
SODIUM SERPL-SCNC: 135 MMOL/L (ref 136–145)
T4 FREE SERPL-MCNC: 1.4 NG/DL (ref 0.8–1.7)
TRIGL SERPL-MCNC: 84 MG/DL (ref 30–149)
TSI SER-ACNC: 0.42 MIU/ML (ref 0.36–3.74)
VLDLC SERPL CALC-MCNC: 13 MG/DL (ref 0–30)

## 2023-10-06 PROCEDURE — 80048 BASIC METABOLIC PNL TOTAL CA: CPT | Performed by: STUDENT IN AN ORGANIZED HEALTH CARE EDUCATION/TRAINING PROGRAM

## 2023-10-06 PROCEDURE — 84439 ASSAY OF FREE THYROXINE: CPT | Performed by: STUDENT IN AN ORGANIZED HEALTH CARE EDUCATION/TRAINING PROGRAM

## 2023-10-06 PROCEDURE — 80061 LIPID PANEL: CPT | Performed by: STUDENT IN AN ORGANIZED HEALTH CARE EDUCATION/TRAINING PROGRAM

## 2023-10-06 PROCEDURE — 84443 ASSAY THYROID STIM HORMONE: CPT | Performed by: STUDENT IN AN ORGANIZED HEALTH CARE EDUCATION/TRAINING PROGRAM

## 2023-11-07 ENCOUNTER — OFFICE VISIT (OUTPATIENT)
Dept: FAMILY MEDICINE CLINIC | Facility: CLINIC | Age: 53
End: 2023-11-07
Payer: COMMERCIAL

## 2023-11-07 VITALS
HEIGHT: 62.5 IN | SYSTOLIC BLOOD PRESSURE: 130 MMHG | WEIGHT: 172 LBS | DIASTOLIC BLOOD PRESSURE: 80 MMHG | RESPIRATION RATE: 18 BRPM | OXYGEN SATURATION: 97 % | BODY MASS INDEX: 30.86 KG/M2 | HEART RATE: 90 BPM

## 2023-11-07 DIAGNOSIS — Z23 NEED FOR VACCINATION: ICD-10-CM

## 2023-11-07 DIAGNOSIS — M79.604 RIGHT LEG PAIN: ICD-10-CM

## 2023-11-07 DIAGNOSIS — I10 PRIMARY HYPERTENSION: Primary | ICD-10-CM

## 2023-11-07 DIAGNOSIS — M54.50 RIGHT LUMBAR PAIN: ICD-10-CM

## 2023-11-07 PROCEDURE — 3079F DIAST BP 80-89 MM HG: CPT | Performed by: STUDENT IN AN ORGANIZED HEALTH CARE EDUCATION/TRAINING PROGRAM

## 2023-11-07 PROCEDURE — 90471 IMMUNIZATION ADMIN: CPT | Performed by: STUDENT IN AN ORGANIZED HEALTH CARE EDUCATION/TRAINING PROGRAM

## 2023-11-07 PROCEDURE — 99213 OFFICE O/P EST LOW 20 MIN: CPT | Performed by: STUDENT IN AN ORGANIZED HEALTH CARE EDUCATION/TRAINING PROGRAM

## 2023-11-07 PROCEDURE — 3075F SYST BP GE 130 - 139MM HG: CPT | Performed by: STUDENT IN AN ORGANIZED HEALTH CARE EDUCATION/TRAINING PROGRAM

## 2023-11-07 PROCEDURE — 3008F BODY MASS INDEX DOCD: CPT | Performed by: STUDENT IN AN ORGANIZED HEALTH CARE EDUCATION/TRAINING PROGRAM

## 2023-11-07 PROCEDURE — 90750 HZV VACC RECOMBINANT IM: CPT | Performed by: STUDENT IN AN ORGANIZED HEALTH CARE EDUCATION/TRAINING PROGRAM

## 2023-11-07 RX ORDER — AMLODIPINE BESYLATE 5 MG/1
5 TABLET ORAL DAILY
Qty: 90 TABLET | Refills: 0 | Status: SHIPPED | OUTPATIENT
Start: 2023-11-07

## 2023-11-07 NOTE — PATIENT INSTRUCTIONS
Take your blood pressure once in the morning and once in the evening for at least 7 days. Take your evening blood pressure at least 30 min or later after you take your evening Losartan. If your average blood pressure is close to 120/80 then this is good. If however your average blood pressure is at 140/90 or above, start taking the amlodipine 5 mg tablet once a day in the mornings. If you start taking amlodipine, check your morning blood pressure 30 min or later after you take the amlodipine in the morning and your evening blood pressure 30 min or later after the losartan.    Again the goal is as close to 120/80 as possible, but if you still find that the blood pressure is at 140/90 or above we can increase the dosage of the amlodipine (just let me know by calling the clinic or sending me a [employer] message)

## 2023-12-08 ENCOUNTER — TELEPHONE (OUTPATIENT)
Dept: GASTROENTEROLOGY | Age: 53
End: 2023-12-08

## 2024-02-21 DIAGNOSIS — I10 PRIMARY HYPERTENSION: ICD-10-CM

## 2024-02-21 DIAGNOSIS — E78.2 MIXED HYPERLIPIDEMIA: ICD-10-CM

## 2024-02-21 RX ORDER — LOSARTAN POTASSIUM 100 MG/1
100 TABLET ORAL DAILY
Qty: 90 TABLET | Refills: 1 | Status: SHIPPED | OUTPATIENT
Start: 2024-02-21

## 2024-02-21 NOTE — TELEPHONE ENCOUNTER
Last office visit: 11/7/2023   Protocol: pass  Requested medication(s) are due for refill today: yes  Requested medication(s) are on the active medication list same strength, form, dose/ sig: yes  Requested medication(s) are managed by provider: yes  Patient has already received a courtsey refill: no    NOV: 2/29/2024

## 2024-02-22 RX ORDER — LOVASTATIN 20 MG/1
20 TABLET ORAL DAILY
Qty: 90 TABLET | Refills: 1 | Status: SHIPPED | OUTPATIENT
Start: 2024-02-22

## 2024-02-29 ENCOUNTER — OFFICE VISIT (OUTPATIENT)
Dept: FAMILY MEDICINE CLINIC | Facility: CLINIC | Age: 54
End: 2024-02-29
Payer: COMMERCIAL

## 2024-02-29 VITALS
HEART RATE: 72 BPM | WEIGHT: 166 LBS | DIASTOLIC BLOOD PRESSURE: 82 MMHG | BODY MASS INDEX: 27.33 KG/M2 | OXYGEN SATURATION: 96 % | HEIGHT: 65.5 IN | SYSTOLIC BLOOD PRESSURE: 120 MMHG

## 2024-02-29 DIAGNOSIS — E03.8 OTHER SPECIFIED HYPOTHYROIDISM: ICD-10-CM

## 2024-02-29 DIAGNOSIS — M21.619 BUNION: ICD-10-CM

## 2024-02-29 DIAGNOSIS — Z12.4 CERVICAL CANCER SCREENING: ICD-10-CM

## 2024-02-29 DIAGNOSIS — M72.2 PLANTAR FASCIITIS: ICD-10-CM

## 2024-02-29 DIAGNOSIS — Z00.00 ANNUAL PHYSICAL EXAM: Primary | ICD-10-CM

## 2024-02-29 DIAGNOSIS — Z23 NEED FOR VACCINATION: ICD-10-CM

## 2024-02-29 DIAGNOSIS — Z00.00 LABORATORY EXAM ORDERED AS PART OF ROUTINE GENERAL MEDICAL EXAMINATION: ICD-10-CM

## 2024-02-29 DIAGNOSIS — Z80.9 MATERNAL FAMILY HISTORY OF CANCER: ICD-10-CM

## 2024-02-29 DIAGNOSIS — L71.9 ROSACEA: ICD-10-CM

## 2024-02-29 LAB
ALBUMIN SERPL-MCNC: 3.8 G/DL (ref 3.4–5)
ALBUMIN/GLOB SERPL: 1 {RATIO} (ref 1–2)
ALP LIVER SERPL-CCNC: 50 U/L
ALT SERPL-CCNC: 17 U/L
ANION GAP SERPL CALC-SCNC: 4 MMOL/L (ref 0–18)
AST SERPL-CCNC: 24 U/L (ref 15–37)
BASOPHILS # BLD AUTO: 0.03 X10(3) UL (ref 0–0.2)
BASOPHILS NFR BLD AUTO: 0.6 %
BILIRUB SERPL-MCNC: 0.9 MG/DL (ref 0.1–2)
BUN BLD-MCNC: 15 MG/DL (ref 9–23)
CALCIUM BLD-MCNC: 9.3 MG/DL (ref 8.5–10.1)
CHLORIDE SERPL-SCNC: 110 MMOL/L (ref 98–112)
CHOLEST SERPL-MCNC: 166 MG/DL (ref ?–200)
CO2 SERPL-SCNC: 22 MMOL/L (ref 21–32)
CREAT BLD-MCNC: 0.76 MG/DL
EGFRCR SERPLBLD CKD-EPI 2021: 94 ML/MIN/1.73M2 (ref 60–?)
EOSINOPHIL # BLD AUTO: 0.05 X10(3) UL (ref 0–0.7)
EOSINOPHIL NFR BLD AUTO: 0.9 %
ERYTHROCYTE [DISTWIDTH] IN BLOOD BY AUTOMATED COUNT: 12.1 %
EST. AVERAGE GLUCOSE BLD GHB EST-MCNC: 114 MG/DL (ref 68–126)
FASTING PATIENT LIPID ANSWER: YES
FASTING STATUS PATIENT QL REPORTED: YES
GLOBULIN PLAS-MCNC: 3.9 G/DL (ref 2.8–4.4)
GLUCOSE BLD-MCNC: 106 MG/DL (ref 70–99)
HBA1C MFR BLD: 5.6 % (ref ?–5.7)
HCT VFR BLD AUTO: 40.3 %
HDLC SERPL-MCNC: 62 MG/DL (ref 40–59)
HGB BLD-MCNC: 13.5 G/DL
IMM GRANULOCYTES # BLD AUTO: 0.02 X10(3) UL (ref 0–1)
IMM GRANULOCYTES NFR BLD: 0.4 %
LDLC SERPL CALC-MCNC: 89 MG/DL (ref ?–100)
LYMPHOCYTES # BLD AUTO: 2.26 X10(3) UL (ref 1–4)
LYMPHOCYTES NFR BLD AUTO: 42.2 %
MCH RBC QN AUTO: 30.3 PG (ref 26–34)
MCHC RBC AUTO-ENTMCNC: 33.5 G/DL (ref 31–37)
MCV RBC AUTO: 90.6 FL
MONOCYTES # BLD AUTO: 0.56 X10(3) UL (ref 0.1–1)
MONOCYTES NFR BLD AUTO: 10.5 %
NEUTROPHILS # BLD AUTO: 2.43 X10 (3) UL (ref 1.5–7.7)
NEUTROPHILS # BLD AUTO: 2.43 X10(3) UL (ref 1.5–7.7)
NEUTROPHILS NFR BLD AUTO: 45.4 %
NONHDLC SERPL-MCNC: 104 MG/DL (ref ?–130)
OSMOLALITY SERPL CALC.SUM OF ELEC: 283 MOSM/KG (ref 275–295)
PLATELET # BLD AUTO: 269 10(3)UL (ref 150–450)
POTASSIUM SERPL-SCNC: 4.4 MMOL/L (ref 3.5–5.1)
PROT SERPL-MCNC: 7.7 G/DL (ref 6.4–8.2)
RBC # BLD AUTO: 4.45 X10(6)UL
SODIUM SERPL-SCNC: 136 MMOL/L (ref 136–145)
TRIGL SERPL-MCNC: 77 MG/DL (ref 30–149)
VLDLC SERPL CALC-MCNC: 12 MG/DL (ref 0–30)
WBC # BLD AUTO: 5.4 X10(3) UL (ref 4–11)

## 2024-02-29 PROCEDURE — 3074F SYST BP LT 130 MM HG: CPT | Performed by: STUDENT IN AN ORGANIZED HEALTH CARE EDUCATION/TRAINING PROGRAM

## 2024-02-29 PROCEDURE — 90686 IIV4 VACC NO PRSV 0.5 ML IM: CPT | Performed by: STUDENT IN AN ORGANIZED HEALTH CARE EDUCATION/TRAINING PROGRAM

## 2024-02-29 PROCEDURE — 99396 PREV VISIT EST AGE 40-64: CPT | Performed by: STUDENT IN AN ORGANIZED HEALTH CARE EDUCATION/TRAINING PROGRAM

## 2024-02-29 PROCEDURE — 84443 ASSAY THYROID STIM HORMONE: CPT | Performed by: STUDENT IN AN ORGANIZED HEALTH CARE EDUCATION/TRAINING PROGRAM

## 2024-02-29 PROCEDURE — 80061 LIPID PANEL: CPT | Performed by: STUDENT IN AN ORGANIZED HEALTH CARE EDUCATION/TRAINING PROGRAM

## 2024-02-29 PROCEDURE — 85025 COMPLETE CBC W/AUTO DIFF WBC: CPT | Performed by: STUDENT IN AN ORGANIZED HEALTH CARE EDUCATION/TRAINING PROGRAM

## 2024-02-29 PROCEDURE — G0438 PPPS, INITIAL VISIT: HCPCS | Performed by: STUDENT IN AN ORGANIZED HEALTH CARE EDUCATION/TRAINING PROGRAM

## 2024-02-29 PROCEDURE — 90471 IMMUNIZATION ADMIN: CPT | Performed by: STUDENT IN AN ORGANIZED HEALTH CARE EDUCATION/TRAINING PROGRAM

## 2024-02-29 PROCEDURE — 83036 HEMOGLOBIN GLYCOSYLATED A1C: CPT | Performed by: STUDENT IN AN ORGANIZED HEALTH CARE EDUCATION/TRAINING PROGRAM

## 2024-02-29 PROCEDURE — 3079F DIAST BP 80-89 MM HG: CPT | Performed by: STUDENT IN AN ORGANIZED HEALTH CARE EDUCATION/TRAINING PROGRAM

## 2024-02-29 PROCEDURE — 84439 ASSAY OF FREE THYROXINE: CPT | Performed by: STUDENT IN AN ORGANIZED HEALTH CARE EDUCATION/TRAINING PROGRAM

## 2024-02-29 PROCEDURE — 90472 IMMUNIZATION ADMIN EACH ADD: CPT | Performed by: STUDENT IN AN ORGANIZED HEALTH CARE EDUCATION/TRAINING PROGRAM

## 2024-02-29 PROCEDURE — 3008F BODY MASS INDEX DOCD: CPT | Performed by: STUDENT IN AN ORGANIZED HEALTH CARE EDUCATION/TRAINING PROGRAM

## 2024-02-29 PROCEDURE — 90750 HZV VACC RECOMBINANT IM: CPT | Performed by: STUDENT IN AN ORGANIZED HEALTH CARE EDUCATION/TRAINING PROGRAM

## 2024-02-29 PROCEDURE — 80053 COMPREHEN METABOLIC PANEL: CPT | Performed by: STUDENT IN AN ORGANIZED HEALTH CARE EDUCATION/TRAINING PROGRAM

## 2024-02-29 NOTE — PROGRESS NOTES
[facility] Family Medicine Office Note    HPI:     Indiana Coello is a 54 year old female presenting for annual exam.     Patient has hx of rosacea and needs renewal of derm referral.     Patient has hx of bunions and plantar fasciitis and would like new podiatry referral.     Patient has HMO insurance and needs referral to her gyne.     Patient's mother with hx of breast cancer, patient inquiring about genetic testing.     Diet: turkey sandwiches, home-cooked meals  Exercise:  gym 2x per week    Aspirin use? (age 50-59 with ASCVD risk 10% or greater): not indicated  Breast cancer (biennial age 50-74, individualized choice age 40-49): mammogram up to date, also getting genetic counselor referral today   Cervical cancer screen? (q3 yrs age 21-29, q3 or q5 w/HPV cotesting age 30-65): gyne referral provided   Colonoscopy screen? (age 45-75 or earlier based on risk): not indicated  Depression screen? (PHQ-2 or PHQ-9): PHQ-2 Score of 0  Diabetes screen? (age 35 to 70 who are overweight or obese): A1c ordered  Fall Prevention? (age 65 and older): not indicated  Lipid panel? (age 20-45 with increased risk of CHD or older than 45): ordered  Lung cancer screen if 50-80 w/ 20 pack year smoking history and currently smoking or quit in last 15 yrs? not indicated  Osteoporosis screen? (DEXA in postmenopausal 65 or older at increased risk): not indicated    HISTORY:  Past Medical History:   Diagnosis Date    Diverticulosis of colon 06/2017    noted on CT a/p done for LLQ pain     Female infertility due to ovulatory disorder     2 cycles of clomid at 100mg, without success.     Fibroids     9/9/22 Pelvic US - 5.5 cm right fundal fibroid. Left uterine body fibroid 6.8 cm.     History of pelvic ultrasound 05/2017    Done for LLQ pain. Two fibroids 4.5 cm & 2.9 cm - intramural/subserosal. Endometrium 8 mm. Ovaries normal. Small free fluid.     Hyperlipidemia 2018    HYPOTHYROIDISM     Hypothyroidism 1990    Pap smear for  cervical cancer screening 04/07/2022    Pap & HPV negative. No abn pap.     Tinnitus 2015    bilateral, intermittent. Not following with ENT currently.       Past Surgical History:   Procedure Laterality Date    BIOPSY VULVA/PERINEUM,ONE LESN  07/05/2017    At perineal body - Path: spongiotic dermatitis (eczematous process.) Fungal stain neg. Dr. Rachel Gore    HERNIA SURGERY N/A 3/16/2022    REPAIR UMBILICAL HERNIA - NO MESH. Yusef Raza MD. [facility]    X-RAY HYSTEROSALPINGOGRAM  12/06/2012    Normal HSG for infertility. Erika. Dr. Mayfield      Family History   Problem Relation Age of Onset    Breast Cancer Mother 50    Thyroid disease Mother     Asthma Mother     Cancer Mother     Heart Disorder Father 60    Thyroid disease Maternal Grandmother     Stroke Maternal Grandmother     Diabetes Paternal Grandmother     Skin cancer Maternal Uncle         Not in contact currently     Ovarian Cancer Neg     Colon Cancer Neg     Colon Polyps Neg     Osteoporosis Neg     Birth Defects Neg     Genetic Disease Neg       Social History:   Social History     Socioeconomic History    Marital status: Single   Tobacco Use    Smoking status: Never    Smokeless tobacco: Never   Vaping Use    Vaping Use: Never used   Substance and Sexual Activity    Alcohol use: Not Currently     Comment: Drink maybe once a month    Drug use: Never    Sexual activity: Not Currently     Birth control/protection: Abstinence   Other Topics Concern    Caffeine Concern Yes     Comment: too much gives me headaches    Weight Concern Yes     Comment: would love a weight lose plan        Medications (Active prior to today's visit):  Current Outpatient Medications   Medication Sig Dispense Refill    COQ10 OR Take 1 tablet by mouth daily.      GARLIC OR Take 1 tablet by mouth daily.      CALCIUM OR Take 1 tablet by mouth daily as needed.      Lovastatin 20 MG Oral Tab Take 1 tablet (20 mg total) by mouth daily. 90 tablet 1    LOSARTAN  100 MG Oral Tab TAKE 1 TABLET(100 MG) BY MOUTH DAILY 90 tablet 1    levothyroxine 125 MCG Oral Tab Take 1 tablet (125 mcg total) by mouth before breakfast.         Allergies:  No Known Allergies      ROS:   Review of Systems   Constitutional:  Negative for chills and fever.     Otherwise see HPI    PHYSICAL EXAM:   /82 (BP Location: Right arm, Patient Position: Sitting, Cuff Size: adult)   Pulse 72   Ht 5' 5.5\" (1.664 m)   Wt 166 lb (75.3 kg)   SpO2 96%   BMI 27.20 kg/m²  Estimated body mass index is 27.2 kg/m² as calculated from the following:    Height as of this encounter: 5' 5.5\" (1.664 m).    Weight as of this encounter: 166 lb (75.3 kg).   Vital signs reviewed.Appears stated age, well groomed.    Physical Exam  Constitutional:       General: She is not in acute distress.  HENT:      Right Ear: External ear normal.      Left Ear: External ear normal.      Nose: Nose normal.      Mouth/Throat:      Mouth: Mucous membranes are moist.      Pharynx: Oropharynx is clear.   Eyes:      Conjunctiva/sclera: Conjunctivae normal.      Pupils: Pupils are equal, round, and reactive to light.   Cardiovascular:      Rate and Rhythm: Normal rate and regular rhythm.      Heart sounds: Normal heart sounds.   Pulmonary:      Effort: Pulmonary effort is normal.      Breath sounds: Normal breath sounds.   Abdominal:      General: Bowel sounds are normal.      Palpations: Abdomen is soft.      Tenderness: There is no abdominal tenderness. There is no guarding or rebound.   Lymphadenopathy:      Cervical: No cervical adenopathy.   Neurological:      Mental Status: She is alert.           ASSESSMENT/PLAN:     54 year old female presenting for annual exam and requesting several referrals as below given her insurance is now [facility].       1. Annual physical exam  - encourage well-balanced diet with fruits/vegetables, limited fried/fatty foods, and limited take-out   - encourage at least 150 minutes of moderate intensity aerobic  activity weekly     2. Laboratory exam ordered as part of routine general medical examination  - CBC With Differential With Platelet  - Comp Metabolic Panel (14)  - Hemoglobin A1C  - Lipid Panel    3. Bunion  - Podiatry Referral - In Network    4. Plantar fasciitis  - Podiatry Referral - In Network    5. Maternal family history of cancer  - Genetic Counselor Referral - [place])    6. Rosacea  - Derm Referral - In Network    7. Cervical cancer screening  - OBG Referral - In Network    8. Need for vaccination  - Zoster Recombinant Adjuvanted (Shingrix -Shingles) [[id number]]  - flu vaccine administered    9. Other specified hypothyroidism  - TSH and Free T4 [E]    Follow-up: as needed    Outcome: Patient verbalizes understanding. Patient is notified to call with any questions, complications, allergies, or worsening or changing symptoms.  Patient is to call with any side effects or complications from the treatments as a result of today.     Total length of visit/charting: approximately     Praveen Granger MD, 02/29/24, 9:06 AM      Please note that portions of this note may have been completed with a voice recognition program. Efforts were made to edit the dictations but occasionally words are mis-transcribed.

## 2024-03-01 DIAGNOSIS — E03.8 OTHER SPECIFIED HYPOTHYROIDISM: Primary | ICD-10-CM

## 2024-03-01 LAB
T4 FREE SERPL-MCNC: 1.2 NG/DL (ref 0.8–1.7)
TSI SER-ACNC: 0.17 MIU/ML (ref 0.36–3.74)

## 2024-03-01 RX ORDER — LEVOTHYROXINE SODIUM 0.1 MG/1
100 TABLET ORAL
Qty: 30 TABLET | Refills: 1 | Status: SHIPPED | OUTPATIENT
Start: 2024-03-01

## 2024-03-19 ENCOUNTER — PATIENT OUTREACH (OUTPATIENT)
Dept: FAMILY MEDICINE CLINIC | Facility: CLINIC | Age: 54
End: 2024-03-19

## 2024-03-26 ENCOUNTER — TELEPHONE (OUTPATIENT)
Dept: FAMILY MEDICINE | Age: 54
End: 2024-03-26

## 2024-04-12 ENCOUNTER — TELEPHONE (OUTPATIENT)
Dept: SCHEDULING | Age: 54
End: 2024-04-12

## 2024-04-12 ENCOUNTER — TELEPHONE (OUTPATIENT)
Dept: GASTROENTEROLOGY | Age: 54
End: 2024-04-12

## 2024-04-12 DIAGNOSIS — Z12.11 SPECIAL SCREENING FOR MALIGNANT NEOPLASMS, COLON: Primary | ICD-10-CM

## 2024-04-12 RX ORDER — BISACODYL 5 MG/1
TABLET, DELAYED RELEASE ORAL
Qty: 2 TABLET | Refills: 0 | Status: SHIPPED | OUTPATIENT
Start: 2024-04-12 | End: 2024-05-27

## 2024-04-12 RX ORDER — SIMETHICONE 125 MG
TABLET,CHEWABLE ORAL
Qty: 2 TABLET | Refills: 0 | Status: SHIPPED | OUTPATIENT
Start: 2024-04-12 | End: 2024-05-27

## 2024-04-25 ENCOUNTER — PATIENT MESSAGE (OUTPATIENT)
Dept: FAMILY MEDICINE CLINIC | Facility: CLINIC | Age: 54
End: 2024-04-25

## 2024-04-25 NOTE — TELEPHONE ENCOUNTER
From: Indiana Coello  To: Praveen Granger  Sent: 4/25/2024 8:15 AM CDT  Subject: New Levothyroxin    I did not get to  the new dosage and now [facility] will not refill my prescription. Can you please resend the new dosage so that they will refill it?! Please and thank you.

## 2024-04-25 NOTE — TELEPHONE ENCOUNTER
Last OV: 2/29/24 PE            Next OV: none  Called [facility] pharmacy re pt's [employer] message, levothyroxine 100 mcg is available on profile, will be filled today.  Pt notified by detailed vm that prescription can be picked up today, recheck TSH/T4 at [facility] after one month of new dosage.

## 2024-05-20 ENCOUNTER — PATIENT MESSAGE (OUTPATIENT)
Dept: FAMILY MEDICINE CLINIC | Facility: CLINIC | Age: 54
End: 2024-05-20

## 2024-05-20 DIAGNOSIS — Z12.31 ENCOUNTER FOR SCREENING MAMMOGRAM FOR MALIGNANT NEOPLASM OF BREAST: Primary | ICD-10-CM

## 2024-05-20 NOTE — TELEPHONE ENCOUNTER
From: Indiana Coello  To: Praveen Granger  Sent: 5/20/2024 9:27 AM CDT  Subject: Mammogram    I just tried to schedule a mammogram and they said there was no referral in the system for one. I thought that was done.

## 2024-05-24 ENCOUNTER — LAB ENCOUNTER (OUTPATIENT)
Dept: LAB | Age: 54
End: 2024-05-24
Attending: STUDENT IN AN ORGANIZED HEALTH CARE EDUCATION/TRAINING PROGRAM

## 2024-05-24 DIAGNOSIS — E03.8 OTHER SPECIFIED HYPOTHYROIDISM: ICD-10-CM

## 2024-05-24 DIAGNOSIS — E03.8 OTHER SPECIFIED HYPOTHYROIDISM: Primary | ICD-10-CM

## 2024-05-24 LAB
T4 FREE SERPL-MCNC: 1.4 NG/DL (ref 0.8–1.7)
TSI SER-ACNC: 0.73 MIU/ML (ref 0.55–4.78)

## 2024-05-24 PROCEDURE — 36415 COLL VENOUS BLD VENIPUNCTURE: CPT

## 2024-05-24 PROCEDURE — 84443 ASSAY THYROID STIM HORMONE: CPT

## 2024-05-24 PROCEDURE — 84439 ASSAY OF FREE THYROXINE: CPT

## 2024-05-24 RX ORDER — LEVOTHYROXINE SODIUM 0.1 MG/1
100 TABLET ORAL
Qty: 90 TABLET | Refills: 1 | Status: SHIPPED | OUTPATIENT
Start: 2024-05-24

## 2024-05-31 ENCOUNTER — HOSPITAL ENCOUNTER (OUTPATIENT)
Dept: MAMMOGRAPHY | Age: 54
Discharge: HOME | End: 2024-05-31
Attending: STUDENT IN AN ORGANIZED HEALTH CARE EDUCATION/TRAINING PROGRAM
Payer: COMMERCIAL

## 2024-05-31 DIAGNOSIS — Z12.31 ENCOUNTER FOR SCREENING MAMMOGRAM FOR MALIGNANT NEOPLASM OF BREAST: ICD-10-CM

## 2024-05-31 PROCEDURE — 77063 BREAST TOMOSYNTHESIS BI: CPT | Performed by: STUDENT IN AN ORGANIZED HEALTH CARE EDUCATION/TRAINING PROGRAM

## 2024-05-31 PROCEDURE — 77067 SCR MAMMO BI INCL CAD: CPT | Performed by: STUDENT IN AN ORGANIZED HEALTH CARE EDUCATION/TRAINING PROGRAM

## 2024-06-24 ENCOUNTER — PATIENT MESSAGE (OUTPATIENT)
Dept: FAMILY MEDICINE CLINIC | Facility: CLINIC | Age: 54
End: 2024-06-24

## 2024-06-24 NOTE — TELEPHONE ENCOUNTER
From: Indiana Coello  To: Praveen Granger  Sent: 6/24/2024 4:58 PM CDT  Subject: Generalized anxiety    How does one get diagnosed with generalized anxiety? I tried CBD but it didn't do anything but make me sleep bad. I'd like to get a professional opinion as to what would work for me. Thanks

## 2024-07-05 ENCOUNTER — OFFICE VISIT (OUTPATIENT)
Dept: PODIATRY CLINIC | Facility: CLINIC | Age: 54
End: 2024-07-05
Payer: COMMERCIAL

## 2024-07-05 DIAGNOSIS — M79.672 LEFT FOOT PAIN: ICD-10-CM

## 2024-07-05 DIAGNOSIS — M72.2 PLANTAR FASCIITIS, RIGHT: Primary | ICD-10-CM

## 2024-07-05 DIAGNOSIS — M79.671 RIGHT FOOT PAIN: ICD-10-CM

## 2024-07-05 DIAGNOSIS — M72.2 PLANTAR FASCIITIS, LEFT: ICD-10-CM

## 2024-07-05 DIAGNOSIS — M20.12 HALLUX VALGUS, LEFT: ICD-10-CM

## 2024-07-05 DIAGNOSIS — M20.11 HALLUX VALGUS, RIGHT: ICD-10-CM

## 2024-07-05 PROCEDURE — 99203 OFFICE O/P NEW LOW 30 MIN: CPT | Performed by: STUDENT IN AN ORGANIZED HEALTH CARE EDUCATION/TRAINING PROGRAM

## 2024-07-05 NOTE — PROGRESS NOTES
[facility]  Progress Note    Indiana Coello is a 54 year old female.   Chief Complaint   Patient presents with    Consult     Bilateral bunions- right is worse than left - Planter fasciitis bilateral feet intermittent-         HPI:     Patient is a pleasant 54-year-old female presents to clinic for evaluation of multiple pedal complaints.  She has bunion deformity to both feet with the right worse than the left.  Her bunions are mostly asymptomatic but she would like to have them evaluated.  She also has plantar fasciitis symptoms to both feet that cause some pain.  No other complaints are mentioned.  Past medical history, medications, and allergies reviewed.        Allergies: Patient has no known allergies.   Current Outpatient Medications   Medication Sig Dispense Refill    levothyroxine 100 MCG Oral Tab Take 1 tablet (100 mcg total) by mouth before breakfast. 90 tablet 1    metroNIDAZOLE 0.75 % External Gel Apply 1 g topically 2 (two) times daily. Apply to face twice daily 45 g 5    Clindamycin Phosphate 1 % External Gel Apply 1 Application topically 2 (two) times daily. Apply to scalp twice daily 30 g 5    COQ10 OR Take 1 tablet by mouth daily.      GARLIC OR Take 1 tablet by mouth daily.      CALCIUM OR Take 1 tablet by mouth daily as needed.      Lovastatin 20 MG Oral Tab Take 1 tablet (20 mg total) by mouth daily. 90 tablet 1    LOSARTAN 100 MG Oral Tab TAKE 1 TABLET(100 MG) BY MOUTH DAILY 90 tablet 1    levothyroxine 125 MCG Oral Tab Take 1 tablet (125 mcg total) by mouth before breakfast.        Past Medical History:    Diverticulosis of colon    noted on CT a/p done for LLQ pain     Female infertility due to ovulatory disorder    2 cycles of clomid at 100mg, without success.     Fibroids    9/9/22 Pelvic US - 5.5 cm right fundal fibroid. Left uterine body fibroid 6.8 cm.     History of pelvic ultrasound    Done for LLQ pain. Two fibroids 4.5 cm & 2.9 cm - intramural/subserosal. Endometrium  8 mm. Ovaries normal. Small free fluid.     Hyperlipidemia    HYPOTHYROIDISM    Hypothyroidism    Pap smear for cervical cancer screening    Pap & HPV negative. No abn pap.     Tinnitus    bilateral, intermittent. Not following with ENT currently.       Past Surgical History:   Procedure Laterality Date    Biopsy vulva/perineum,one lesn  07/05/2017    At perineal body - Path: spongiotic dermatitis (eczematous process.) Fungal stain neg. Dr. Rachel Gore    Hernia surgery N/A 3/16/2022    REPAIR UMBILICAL HERNIA - NO MESH. [place]usef Raza [place]. [facility] [place] [facility]    X-ray hysterosalpingogram  12/06/2012    Normal HSG for infertility. Erika. Dr. Mayfield      Family History   Problem Relation Age of Onset    Breast Cancer Mother 50    Thyroid disease Mother     Asthma Mother     Cancer Mother     Heart Disorder Father 60    Thyroid disease Maternal Grandmother     Stroke Maternal Grandmother     Diabetes Paternal Grandmother     Skin cancer Maternal Uncle         Not in contact currently     Ovarian Cancer Neg     Colon Cancer Neg     Colon Polyps Neg     Osteoporosis Neg     Birth Defects Neg     Genetic Disease Neg       Social History     Socioeconomic History    Marital status: Single   Tobacco Use    Smoking status: Never    Smokeless tobacco: Never   Vaping Use    Vaping status: Never Used   Substance and Sexual Activity    Alcohol use: Not Currently     Comment: Drink maybe once a month    Drug use: Never    Sexual activity: Not Currently     Birth control/protection: Abstinence   Other Topics Concern    Caffeine Concern Yes     Comment: too much gives me headaches    Weight Concern Yes     Comment: would love a weight lose plan           REVIEW OF SYSTEMS:     Today reviewed systems as documented below  GENERAL HEALTH: feels well otherwise  SKIN: denies any unusual skin lesions or rashes  RESPIRATORY: denies shortness of breath with exertion  CARDIOVASCULAR: denies chest pain on exertion  GI: denies  abdominal pain and denies heartburn  NEURO: denies headaches  MUSCULO: denies arthritis, back pain      EXAM:   There were no vitals taken for this visit.  GENERAL: well developed, well nourished, in no apparent distress  EXTREMITIES:   1. Integument: Normal skin temperature and turgor  2. Vascular: Dorsalis pedis two out of four bilateral and posterior tibial pulses two out of   four bilateral, capillary refill normal.   3. Musculoskeletal: All muscle groups are graded 5 out of 5 in the foot and ankle.  Hallux valgus deformity noted bilaterally with the right worse than the left.  No major pain noted to sites.  Mildly diminished first MPJ range of motion.  Rectus foot type appreciated.  Pain on the plantar heels bilaterally.   4. Neurological: Normal sharp dull sensation; reflexes normal.          ASSESSMENT AND PLAN:   Diagnoses and all orders for this visit:    Plantar fasciitis, right  -     [facility] POD XR - RT FOOT 2 VIEWS(AP, LATERAL)[facility]  -     Physical Therapy Referral - [facility]    Plantar fasciitis, left  -     [facility] POD XR - LT FOOT 2 VIEWS(AP, LATERAL)[facility]  -     Physical Therapy Referral - [facility]    Hallux valgus, left    Hallux valgus, right    Right foot pain    Left foot pain        Plan:    -Patient examined, chart history reviewed.  -Discussed etiology of condition and various treatment options.  -X-rays obtained and reviewed.  Moderate to severe bunion right foot moderate bunion from her left foot.  Discussed treatment options including conservative and surgical treatment options.  Because patient is most without pain I would not recommend surgery at this time.  Discussed that pain and function should be guide when pursuing surgery.  Should ambulate with supportive shoes with wide toe box.  If symptoms worsen or fail to improve may consider Lapidus bunionectomy.  -Patient does have plantar fasciitis symptoms to both feet.  -Discussed importance of supportive  shoes/inserts, regular stretching, and anti-inflammatories.  -Referral placed to physical therapy.  -Informational handout dispensed recommend shoes and inserts.  -If symptoms worsen or fail to improve would consider cortisone shot.  -Can follow-up in 1 to 2 months for reevaluation or as needed moving forward.  Appreciate the opportunity participate in patient's care.    The patient indicates understanding of these issues and agrees to the plan.        Leandro Byrne DPM    Dragon speech recognition software was used to prepare this note.  Errors in word recognition may occur.  Please contact me with any questions/concerns with this note.

## 2024-07-19 ENCOUNTER — PATIENT OUTREACH (OUTPATIENT)
Dept: FAMILY MEDICINE CLINIC | Facility: CLINIC | Age: 54
End: 2024-07-19

## 2024-07-29 ENCOUNTER — APPOINTMENT (OUTPATIENT)
Dept: FAMILY MEDICINE | Age: 54
End: 2024-07-29

## 2024-08-05 ENCOUNTER — TELEPHONE (OUTPATIENT)
Dept: PHYSICAL THERAPY | Facility: HOSPITAL | Age: 54
End: 2024-08-05

## 2024-08-05 ENCOUNTER — PATIENT MESSAGE (OUTPATIENT)
Dept: FAMILY MEDICINE CLINIC | Facility: CLINIC | Age: 54
End: 2024-08-05

## 2024-08-05 DIAGNOSIS — L71.9 ROSACEA: Primary | ICD-10-CM

## 2024-08-05 NOTE — TELEPHONE ENCOUNTER
From: Indiana Coello  To: Praveen Granger  Sent: 8/5/2024 9:13 AM CDT  Subject: Dr. Garcia    If possible please, I need a new referral for Dr. Garcia. I have a red, patchy spot that I need him to take a look at and they said I'd need a new referral. I did make an appt. for Monday Aug. 26th. Please and thank you

## 2024-08-14 ENCOUNTER — TELEPHONE (OUTPATIENT)
Dept: PHYSICAL THERAPY | Facility: HOSPITAL | Age: 54
End: 2024-08-14

## 2024-08-19 DIAGNOSIS — I10 PRIMARY HYPERTENSION: ICD-10-CM

## 2024-08-19 RX ORDER — LOSARTAN POTASSIUM 100 MG/1
100 TABLET ORAL DAILY
Qty: 90 TABLET | Refills: 1 | Status: SHIPPED | OUTPATIENT
Start: 2024-08-19

## 2024-08-19 NOTE — H&P
[facility]  Obstetrics and Gynecology   History & Physical  Est- last visit 22     Chief complaint:   Chief Complaint   Patient presents with    Wellness Visit    Other     Recommendation on deodorant to use due to others not working.     Subjective:     HPI: Indiana Coello is a 54 year old  with Patient's last menstrual period was 2024 (exact date).    Here for WWE.   Chaperone declines     Last OV 2022 establish care, \"WWE\" but with multiple complaints. C/o urinary problems intermittently. intermittent vulvar burning sensation. 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 Intermittent right sided pelvic pain after period. Sometimes on the left      Per review of EMR  -Used to see Dr. Rachel Gore OB/GYN   -Pelvic US 2017 done for LLQ pain x 1 day occurring about 2 days after ovulation. Showed two intramural/subserosal fibroids. Ovaries normal. Small free fluid.   -CT a/p 2017 for LLQ pain - probable 1.3 cm right adrenal gland adenoma, few colonic diverticula, uterus enlarged & heterogeneous compatible with fibroids. Trace free fluid in cul de sac.   -2017 - C/o persistent vulvar burning. Tried diflucan, nystatin-triamcinolone cream, terazol without relief.  -Vulvar biopsy at [facility] 2017 - spongiotic dermatitis (eczematous process.) Fungal stain neg - Dr. Gore     Last OV 2022 establish care, \"WWE\" but with multiple complaints. C/o urinary problems intermittently. intermittent vulvar burning sensation. 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 Intermittent right sided pelvic pain after period. Sometimes on the left      Patient thinks she has vulvodynia.   Wears a daily liner anyways for discharge. Even from teens would have lots of discharge. No malodor. White clear. No itching.   Says she used to have \"yeast infections\" a lot    Started having a burning sensation on the inner aspect of the labia majora - intermittent.   Burns a lot while on period. If sitting too long sometimes will burn.  During the \"burning sensation\" her urine will smell. Taking a hot bath burns. Chlorine irritates it too.     Has not seen a dermatologist.   Has never been on vaginal estrogen.   Has not done pelvic floor PT  Intermittent urinary frequency & nocturia. +CHRSITOPHER for years     Vulva  -warm water or some dove soap at times  -no vaginal or vulvar moisturizers, no vaseline/barrier ointments  -pats dry usually    C/o intermittent right sided pain after the period. Can be left sided at times.    VULVA: generalized erythema of the labia minora, vestibule, perineum, hemorrhoids  URETHRA: unremarkable   PERINEUM: some redundant tissue is erythematous  VAGINA: atrophic appearing vagina with normal color and discharge, no lesions   CERVIX: atrophic  UTERUS: Feels slightly lobular, enlarged more on the right side  ADNEXA: normal adnexa in size, nontender and no masses  PELVIC FLOOR: +moderate hypertonicity, +moderate tenderness. Weak squeeze.     Impression: \"Vulvar burning\" intermittently for a few years with h/o vulvar biopsy in 2017 showing eczematous change not consistent with vulvodynia. Suspect pelvic floor dysfunction & relative atrophy, sensitivity to chemicals & mechanical contact is probably the cause of her burning  Ordered pap & HPV, UA, Ucx, vaginitis swab, pelvic floor PT, pelvic US, mammogram  Urine dip negative but high SG  Advised vulvar monistat & skin protectants, avoid pantiliners    9/9/22 Pelvic US 5.5 cm right fundal fibroid. Left uterine body fibroid 6.8 cm.   Done cycle day 9. There is a 5.5 cm right fundal fibroid and a 6.8 cm left uterine body fibroid. Ovaries normal. These fibroids are significantly larger than they used to be in 2017 at the time of the last ultrasound that I know of.     Large fibroids can exert pressure on the bladder and cause urinary frequency, etc. This could also worsen stress incontinence but probably would not be the only reason for stress urinary incontinence.     Recommend rule  out urinary tract infection - has this been done with PCP?  Recommend see urogynecology. They can do urodynamic testing & see if she may be a candidate for a sling.  Pelvic floor PT should help  Hysterectomy to get rid of the enlarged uterus may also help with the bladder symptoms.    As of today 8/23/2024    Here for WWE    Pelvic floor PT attended 10/2022 - 1/2023 for CHRISTOPHER, urinary frequency, pelvic floor dysfunction. CHRISTOPHER resolved. Feels she can empty bladder.   Vulvar itching - coconut oil helps   Vaginal discharge -  no change.   Pantiliners? Changes every few hours.   Skin protectants? no  Derm for vulva? no   Sometimes urinary frequency prior to the periods but other than that the PT helped.   Periods - sometimes a week early or a week late. The most recent bleed was on & off - was on time. That was the first time. 2 days long. Not too heavy. Cramping is left side of pelvis basically as the period is ending. Prior to the period gets headaches & breast tenderness.   No sex in a while.     Dermatologist Mckay Garcia     DHEA - started taking about 1 year ago from Dr. [facility]. Hasn't noticed anything but increased the dose recently. Was told that low testosterone could cause irribility     PCP: Praveen Granger MD    Review of Systems   Constitutional:  Negative for unexpected weight change.   Eyes:  Negative for visual disturbance.        Wears contacts    Respiratory:  Negative for cough and shortness of breath.    Cardiovascular:  Negative for chest pain, palpitations and leg swelling.   Gastrointestinal:  Negative for blood in stool, constipation, diarrhea and rectal pain.        BM usually 2 times per day in the morning. Usual pattern. Normal consistency. No blood or pain with BM   Endocrine:        No hot flashes or sweats.    Genitourinary:  Positive for frequency, menstrual problem, pelvic pain and vaginal discharge. Negative for dyspareunia, dysuria and vaginal bleeding.        Wears a daily liner  anyways for discharge. Even from teens would have lots of discharge. No malodor. White clear. No itching.     Says she used to have \"yeast infections\" a lot    Started having a burning sensation on the inner aspect of the labia majora - intermittent. Burns a lot while on period. If sitting too long sometimes will burn. During the \"burning sensation\" her urine will smell. Taking a hot bath burns. Chlorine irritates it too.     Has never been on vaginal estrogen.     Vulva  -warm water or some dove soap at times  -no vaginal or vulvar moisturizers, no vaseline/barrier ointments  -pats dry usually    1/2022 Periods becoming irregular. No cramping on the period. Used to cramp as a teen & was bad. After the period ends will sometimes get pain on the right side but could be left.     CHRISTOPHER, urinary urgency, nocturia - attended pelvic floor PT with good results.     Nipples get painful during the period - few years. Each period is different - some months no pain & sometimes it does.     No breast lumps.          Musculoskeletal:         Bunion right more than left.   Bilateral plantar fasciitis - has seen podiatrist   Skin:         Rosacea - sees derm.      Neurological:  Positive for headaches.        H/o sinus like headache   Psychiatric/Behavioral:  Positive for sleep disturbance. The patient is nervous/anxious.         6/25/24 Telemedicine visit with PCP for generalized anxiety the majority of her life. Anxiety attacks at work where she feels hot & can get hard to breathe.     C/o mood swings up & down.     Around period does not sleep well. Has to get up to urinate more often around the period. Does not have hot flashes or night sweats      GYN Hx   Menarche 18   Used to be q 28 days. Cramping bad in her teens  OCPs in 20s  Last 10 years - 26 days x 3 days x moderate flow x mild cramping.   +H/o infertility - ovulatory dysfunction. HSG normal in EMR but patient states \"tubes were too small\"      Menses getting irregular  over the past few months  (early )   Last period only 1 day of bleeding & was 1 week late. The month prior to that the period was 1 week early    Not sexually active.  No h/o dyspareunia.     Contraception: abstinence   STDs: no     No abn pap.   2017 Pap neg   22 Pap & HPV negative     Mammogram neg 24 - per PCP  Colonoscopy - not yet. Has scheduled for 10/2024   DEXA scan - never     OB History:  OB History    Para Term  AB Living   0 0 0 0 0 0   SAB IAB Ectopic Multiple Live Births   0 0 0 0 0     OB History    Para Term  AB Living   0 0 0 0 0 0   SAB IAB Ectopic Multiple Live Births   0 0 0 0         Meds:  Current Outpatient Medications on File Prior to Visit   Medication Sig Dispense Refill    Prasterone, DHEA, (DHEA 50 OR) Take by mouth.      NON FORMULARY Per pt, she is taking steroids, does not know the name.      LOVASTATIN 20 MG Oral Tab TAKE 1 TABLET(20 MG) BY MOUTH DAILY 90 tablet 1    levothyroxine 125 MCG Oral Tab Take 1 tablet (125 mcg total) by mouth before breakfast.      LOSARTAN 100 MG Oral Tab TAKE 1 TABLET(100 MG) BY MOUTH DAILY 90 tablet 1    metroNIDAZOLE 0.75 % External Gel Apply 1 g topically 2 (two) times daily. Apply to face twice daily 45 g 5    COQ10 OR Take 1 tablet by mouth daily.      GARLIC OR Take 1 tablet by mouth daily.      CALCIUM OR Take 1 tablet by mouth daily as needed.      Clindamycin Phosphate 1 % External Gel Apply 1 Application topically 2 (two) times daily. Apply to scalp twice daily (Patient not taking: Reported on 2024) 30 g 5     No current facility-administered medications on file prior to visit.       All:  No Known Allergies    PMH:  Past Medical History:    Anxiety, generalized    generalized anxiety the majority of her life. Anxiety attacks at work where she feels hot & can get hard to breathe.    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 +H/o infertility - ovulatory dysfunction. HSG normal in EMR but patient states \"tubes were too small\"    Fibroids    9/9/22 Pelvic US - 5.5 cm right fundal fibroid. Left uterine body fibroid 6.8 cm.     History of pelvic ultrasound    Done for LLQ pain. Two fibroids 4.5 cm & 2.9 cm - intramural/subserosal. Endometrium 8 mm. Ovaries normal. Small free fluid.     Hyperlipidemia    Hypothyroidism    Pap smear for cervical cancer screening    Pap & HPV negative. No abn pap.     Rosacea    sees derm    CHRISTOPHER (stress urinary incontinence, female)    Pelvic floor PT attended 10/2022 - 1/2023 for CHRISTOPHER, urinary frequency, pelvic floor dysfunction. CHRISTOPHER resolved. Feels she can empty bladder.    Tinnitus    bilateral, intermittent. Not following with ENT currently.        PSH:  Past Surgical History:   Procedure Laterality Date    Biopsy vulva/perineum,one lesn  07/05/2017    At perineal body - Path: spongiotic dermatitis (eczematous process.) Fungal stain neg. Dr. Rachel Gore    Hernia surgery N/A 3/16/2022    REPAIR UMBILICAL HERNIA - NO MESH. Yusef Raza MD. [facility]GICAL [facility]    X-ray hysterosalpingogram  12/06/2012    Normal HSG for infertility. Erika. Dr. Mayfield       Social History:  Social History     Socioeconomic History    Marital status: Single     Spouse name: Not on file    Number of children: Not on file    Years of education: Not on file    Highest education level: Not on file   Occupational History    Not on file   Tobacco Use    Smoking status: Never    Smokeless tobacco: Never   Vaping Use    Vaping status: Never Used   Substance and Sexual Activity    Alcohol use: Not Currently     Comment: Drink maybe once a month    Drug use: Never    Sexual activity: Not Currently     Birth control/protection: Abstinence   Other Topics Concern    Caffeine Concern Yes     Comment: too much gives me  headaches    Weight Concern Yes     Comment: would love a weight lose plan   Social History Narrative    Not on file     Social Determinants of Health     Financial Resource Strain: Not on file   Food Insecurity: Not on file   Transportation Needs: Not on file   Physical Activity: Not on file   Stress: Not on file   Social Connections: Not on file   Housing Stability: Not on file        Family History:  Family History   Problem Relation Age of Onset    Breast Cancer Mother 50    Thyroid disease Mother     Asthma Mother     Other (anxiety) Mother     Heart Disorder Father 60    Thyroid disease Maternal Grandmother     Stroke Maternal Grandmother     Diabetes Paternal Grandmother     Skin cancer Maternal Uncle         Not in contact currently     Ovarian Cancer Neg     Colon Cancer Neg     Colon Polyps Neg     Osteoporosis Neg     Birth Defects Neg     Genetic Disease Neg        Objective:     Vitals:    08/23/24 0903   BP: 142/82   Pulse: 82   Weight: 167 lb 9.6 oz (76 kg)   Height: 62.5\"         Body mass index is 30.17 kg/m².    Physical Exam:  Physical Exam  Vitals and nursing note reviewed.   Constitutional:       Appearance: Normal appearance. She is obese.   HENT:      Head: Normocephalic and atraumatic.   Eyes:      Extraocular Movements: Extraocular movements intact.      Conjunctiva/sclera: Conjunctivae normal.   Neck:      Comments: No thyromegaly  Cardiovascular:      Rate and Rhythm: Normal rate and regular rhythm.      Heart sounds: No murmur heard.  Pulmonary:      Effort: Pulmonary effort is normal.      Breath sounds: Normal breath sounds.      Comments: Breasts: no masses, no axillary lymphadenopathy  Abdominal:      General: There is distension.      Palpations: Abdomen is soft. There is no mass.      Tenderness: There is no abdominal tenderness. There is no guarding or rebound.      Hernia: No hernia is present.      Comments: Diffusely distended - moderate to significant, especially along the  entire right side of abdomen without distinct mass. Obese. Tympanitic. Supraumbilical incision closed, no erythema or drainage.    Genitourinary:     Comments: VULVA: dry & slightly red looking skin in the thigh creases but otherwise ok. No lesions.  URETHRA: unremarkable   PERINEUM: some redundant tissue is erythematous  VAGINA: atrophic appearing vagina with normal color and discharge, no lesions   CERVIX: atrophic  UTERUS: Feels slightly lobular, enlarged more on the right side. Difficult to appreciate well due to distension  ADNEXA: normal adnexa in size, nontender and no masses  PELVIC FLOOR: +moderate hypertonicity, mild tenderness.      Musculoskeletal:         General: No tenderness.      Right lower leg: No edema.      Left lower leg: No edema.   Neurological:      General: No focal deficit present.      Mental Status: She is alert.      Cranial Nerves: No cranial nerve deficit.   Psychiatric:         Mood and Affect: Mood normal.         Behavior: Behavior normal.         Thought Content: Thought content normal.         Judgment: Judgment normal.         Labs:  Lab Results   Component Value Date    WBC 5.4 02/29/2024    RBC 4.45 02/29/2024    HGB 13.5 02/29/2024    HCT 40.3 02/29/2024    MCV 90.6 02/29/2024    MCH 30.3 02/29/2024    MCHC 33.5 02/29/2024    RDW 12.1 02/29/2024    .0 02/29/2024        Lab Results   Component Value Date     (H) 02/29/2024    BUN 15 02/29/2024    CREATSERUM 0.76 02/29/2024    ANIONGAP 4 02/29/2024    CA 9.3 02/29/2024    OSMOCALC 283 02/29/2024    ALKPHO 50 02/29/2024    AST 24 02/29/2024    ALT 17 02/29/2024    BILT 0.9 02/29/2024    TP 7.7 02/29/2024    ALB 3.8 02/29/2024    GLOBULIN 3.9 02/29/2024    AGRATIO 1.6 02/25/2016     02/29/2024    K 4.4 02/29/2024     02/29/2024    CO2 22.0 02/29/2024       Lab Results   Component Value Date    CHOLEST 166 02/29/2024    TRIG 77 02/29/2024    HDL 62 (H) 02/29/2024    LDL 89 02/29/2024    VLDL 12 02/29/2024     NONHDLC 104 02/29/2024        Lab Results   Component Value Date    T4F 1.4 05/24/2024    TSH 0.728 05/24/2024        Lab Results   Component Value Date     02/29/2024    A1C 5.6 02/29/2024       Imaging:    PROCEDURE:  US PELVIS W EV (CPT=76856,40596)     LOCATION:  [facility]     COMPARISON:  None.     INDICATIONS:  Z12.31 Encounter for screening mammogram for malignant neoplasm of breast     TECHNIQUE:  Pelvic ultrasound using transabdominal and endovaginal technique.  Transvaginal ultrasound was used to better evaluate adnexal and endometrial detail.     PATIENT STATED HISTORY: (As transcribed by Technologist)  Patient stated that this is to follow-up on her fibroids. She had imaging outside of [facility].         FINDINGS:                UTERUS:  11.86 cm x 5.31 cm x 4.94 cm    Endometrium Thickness: 0.72 cm    5.4 x 4.8 x 5.5 cm fibroid right fight fundus, another fibroid 6.8 x 5.6 x 5.6 cm left uterine body.  RIGHT OVARY:  3.4 x 1.4 x 2.3 cm    The right ovary appears normal in size, shape, and echogenicity. No significant masses are identified.  LEFT OVARY:  2.85 cm x 1.57 cm x 2.06 cm    The left ovary appears normal in size, shape, and echogenicity. No significant masses are identified.  CUL-DE-SAC:  Normal.  No fluid or mass.    OTHER:  Negative.                        Impression   CONCLUSION:  Uterine fibroids, with 2 fibroids present, largest measuring 6.8 cm.  No free fluid.  Normal ovaries.          Dictated by (CST): Francisco Baca MD on 9/09/2022 at 1:50 PM      Finalized by (CST): Francisco Baca MD on 9/09/2022 at 1:55 PM       [facility] DANIEL 2D+3D SCREENING BILAT (CPT=77067/68303)    Result Date: 5/31/2024  CONCLUSION:   BI-RADS CATEGORY:  DIAGNOSTIC CATEGORY 2--BENIGN FINDING NO CHANGE FROM COMPARISON ASSESSMENT.   RECOMMENDATIONS:  ROUTINE MAMMOGRAM AND CLINICAL EVALUATION IN 12 MONTHS.     Your patient's answers to the health and family history questions collected during this mammogram  indicate a potentially increased risk for breast cancer. It is recommended that this patient be evaluated in our Cancer Risk Assessment Clinic to determine eligibility for additional breast cancer screening, risk reduction strategies, and/or genetic testing. Providers are encouraged to contact our breast navigators at [phone] with any questions or for guidance regarding this recommendation.  A letter explaining the results in lay terms has been sent to the patient.  This exam was evaluated with a computer-aided device.  This patient's information has been entered into a reminder system with a target due date for the next mammogram.   LOCATION:  [facility]   Dictated by (CST): Terence Kingsley MD on 2024 at 11:54 AM     Finalized by (CST): Terence Kingsley MD on 2024 at 11:58 AM          Assessment:     Indiana Coello is a 54 year old  female here for WWE. Abdomen is full on the right side. Does have fibroids.      Diagnoses and all orders for this visit:    Encounter for well woman exam with abnormal findings    Screening for cervical cancer  -     ThinPrep PAP Smear; Future  -     Hpv Dna  High Risk , Thin Prep Collect; Future    Encounter for screening mammogram for malignant neoplasm of breast  -     [facility] DANIEL 2D+3D SCREENING BILAT (CPT=77067/55592); Future    Fibroids  -     US PELVIS W EV (CPT=76856/35034); Future    Chronic vulvitis    Abdominal fullness in right lower quadrant  -     US PELVIS W EV (CPT=76856/77122); Future           Plan:     Urinary complaints (intermittent frequency & CHRISTOPHER)  -s/p PFPT 10/2022   -2024 denies CHRISTOPHER, just some frequency before periods     C/o chronic large amount of vaginal discharge  -Affirm negative 22  -probably from chronic irritation/skin barrier breakdown - wearing liners daily & has CHRISTOPHER  -2024 looks slightly inflammatory. No malodor     \"Vulvar burning\" intermittently for a few years (2022)   -vulvar biopsy in  showing eczematous  change  -labia overall are erythematous, some atrophy noted.   -has not seen derm  -not using any skin protectants or anti-inflammatories  -Not consistent with vulvodynia   -suspect pelvic floor dysfunction & relative atrophy, sensitivity to chemicals & mechanical contact is probably the cause of her burning  -Affirm negative 4/7/22  -pelvic floor PT encouraged   -vulvar hygiene measures   -8/2024 just slightly red/dry in the thigh creases but the labia look good.      Fibroids  -5/2017 Done for LLQ pain. Two fibroids 4.5 cm & 2.9 cm - intramural/subserosal. Endometrium 8 mm. Ovaries normal. Small free fluid.   -9/9/22 Pelvic US - 5.5 cm right fundal fibroid. Left uterine body fibroid 6.8 cm.   -8/2024 periods - not too bad. Slightly irregular. Abdomen moderate to significantly distended, especially along entire right side of abdomen but without distinct mass. Denies pain. Had BM already today. Does admit to bloating prior to periods    Perimenopause, mood swings, but no VMS  -discussed could consider some non hormonal therapies - antidepressants, Serenol (herbal)     Generalized anxiety  -has been referred to therapist per PCP    Pap & HPV neg 4/7/22.   -up to date per guidelines. Patient prefers pap is done today.     Breast exam benign 8/23/2024     Mammogram neg 5/31/24 - per PCP. Rx for 5/2025   Contraception - abstinence   Colonoscopy encouraged - has scheduled for 10/2024   RTC 1 year for WWE after 8/23/2025       Nicol Pickering MD  [facility] - OBGYN

## 2024-08-19 NOTE — TELEPHONE ENCOUNTER
Last office visit: 02/29/2024   Protocol: PASS    Requested medication(s) are due for refill today: Yes    Requested medication(s) are on the active medication list same strength, form, dose/ sig: Yes    Requested medication(s) are managed by provider: Yes    Patient has already received a courtsey refill: No    NOV: 08/20/2024  Last Labs: 02/29/24  Asked to Return: PRN

## 2024-08-20 ENCOUNTER — TELEPHONE (OUTPATIENT)
Dept: PHYSICAL THERAPY | Facility: HOSPITAL | Age: 54
End: 2024-08-20

## 2024-08-20 ENCOUNTER — PATIENT MESSAGE (OUTPATIENT)
Dept: FAMILY MEDICINE CLINIC | Facility: CLINIC | Age: 54
End: 2024-08-20

## 2024-08-20 ENCOUNTER — OFFICE VISIT (OUTPATIENT)
Dept: FAMILY MEDICINE CLINIC | Facility: CLINIC | Age: 54
End: 2024-08-20
Payer: COMMERCIAL

## 2024-08-20 VITALS
WEIGHT: 168 LBS | SYSTOLIC BLOOD PRESSURE: 130 MMHG | RESPIRATION RATE: 18 BRPM | HEIGHT: 62.5 IN | BODY MASS INDEX: 30.14 KG/M2 | DIASTOLIC BLOOD PRESSURE: 80 MMHG | OXYGEN SATURATION: 96 % | HEART RATE: 82 BPM

## 2024-08-20 DIAGNOSIS — M25.511 ACUTE PAIN OF RIGHT SHOULDER: Primary | ICD-10-CM

## 2024-08-20 PROCEDURE — 3008F BODY MASS INDEX DOCD: CPT | Performed by: STUDENT IN AN ORGANIZED HEALTH CARE EDUCATION/TRAINING PROGRAM

## 2024-08-20 PROCEDURE — 3075F SYST BP GE 130 - 139MM HG: CPT | Performed by: STUDENT IN AN ORGANIZED HEALTH CARE EDUCATION/TRAINING PROGRAM

## 2024-08-20 PROCEDURE — 3079F DIAST BP 80-89 MM HG: CPT | Performed by: STUDENT IN AN ORGANIZED HEALTH CARE EDUCATION/TRAINING PROGRAM

## 2024-08-20 PROCEDURE — 99213 OFFICE O/P EST LOW 20 MIN: CPT | Performed by: STUDENT IN AN ORGANIZED HEALTH CARE EDUCATION/TRAINING PROGRAM

## 2024-08-20 RX ORDER — METHYLPREDNISOLONE 4 MG
TABLET, DOSE PACK ORAL
Qty: 21 EACH | Refills: 0 | Status: SHIPPED | OUTPATIENT
Start: 2024-08-20 | End: 2024-08-23

## 2024-08-20 RX ORDER — LEVOTHYROXINE SODIUM 125 UG/1
125 TABLET ORAL
COMMUNITY

## 2024-08-20 RX ORDER — LEVOTHYROXINE SODIUM 100 UG/1
100 TABLET ORAL
COMMUNITY
End: 2024-08-20 | Stop reason: ALTCHOICE

## 2024-08-20 NOTE — TELEPHONE ENCOUNTER
From what I see no blood work that she is particularly due for. She already had annual labs done earlier this year in February and repeat thyroid labs in May so no specific testing indicated.     Praveen Granger MD, 08/20/24, 10:04 AM

## 2024-08-20 NOTE — TELEPHONE ENCOUNTER
From: Indiana Coello  To: Praveen Granger  Sent: 8/20/2024 9:18 AM CDT  Subject: Bloodwork?     If I wasn't to eat today could I get a bloodwork done today as well? I know short notice, but I was thinking I could just kill 2 birds with 1 stone. Then I could get my medication renewed

## 2024-08-20 NOTE — PROGRESS NOTES
[facility] Family Medicine Office Note    HPI:     Indiana Coello is a 54 year old female presenting for right shoulder pain.     Dull pain in anterior shoulder, posterior, shoulder, and armpit over past 2 weeks. Denies any lumps in armpit. When there is pain particularly inside of the shoulder there is also associated numbness/tingling in the hand (but not radiating down the entire arm).     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 When she feeds her cats she states she will lean against her bed and will notice pain in shoulder as well just by leaning against bed. Abducting regularly can sometimes reduce the pain interestingly enough.     HISTORY:  Past Medical History:    Anxiety, generalized    generalized anxiety the majority of her life. Anxiety attacks at work where she feels hot & can get hard to breathe.    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 +H/o infertility - ovulatory dysfunction. HSG normal in EMR but patient states \"tubes were too small\"    Fibroids    9/9/22 Pelvic US - 5.5 cm right fundal fibroid. Left uterine body fibroid 6.8 cm.     History of pelvic ultrasound    Done for LLQ pain. Two fibroids 4.5 cm & 2.9 cm - intramural/subserosal. Endometrium 8 mm. Ovaries normal. Small free fluid.     Hyperlipidemia    Hypothyroidism    Pap smear for cervical cancer screening    Pap & HPV negative. No abn pap.     Renan    sees derm    CHRISTOPHER (stress urinary incontinence, female)    Pelvic floor PT attended 10/2022 - 1/2023 for CHRISTOPHER, urinary frequency, pelvic floor dysfunction. CHRISTOPHER resolved. Feels she can empty bladder.    Tinnitus    bilateral, intermittent. Not following with ENT currently.       Past Surgical History:   Procedure Laterality Date    Biopsy  vulva/perineum,one lesn  07/05/2017    At perineal body - Path: spongiotic dermatitis (eczematous process.) Fungal stain neg. Dr. Rachel Gore    Hernia surgery N/A 3/16/2022    REPAIR UMBILICAL HERNIA - NO MESH. Yusef Raza MD. [facility], [facility]    X-ray hysterosalpingogram  12/06/2012    Normal HSG for infertility. Erika. Dr. Mayfield      Family History   Problem Relation Age of Onset    Breast Cancer Mother 50    Thyroid disease Mother     Asthma Mother     Other (anxiety) Mother     Heart Disorder Father 60    Thyroid disease Maternal Grandmother     Stroke Maternal Grandmother     Diabetes Paternal Grandmother     Skin cancer Maternal Uncle         Not in contact currently     Ovarian Cancer Neg     Colon Cancer Neg     Colon Polyps Neg     Osteoporosis Neg     Birth Defects Neg     Genetic Disease Neg       Social History:   Social History     Socioeconomic History    Marital status: Single   Tobacco Use    Smoking status: Never    Smokeless tobacco: Never   Vaping Use    Vaping status: Never Used   Substance and Sexual Activity    Alcohol use: Not Currently     Comment: Drink maybe once a month    Drug use: Never    Sexual activity: Not Currently     Birth control/protection: Abstinence   Other Topics Concern    Caffeine Concern Yes     Comment: too much gives me headaches    Weight Concern Yes     Comment: would love a weight lose plan        Medications (Active prior to today's visit):  Current Outpatient Medications   Medication Sig Dispense Refill    levothyroxine 125 MCG Oral Tab Take 1 tablet (125 mcg total) by mouth before breakfast.      methylPREDNISolone (MEDROL) 4 MG Oral Tablet Therapy Pack As directed. 21 each 0    LOSARTAN 100 MG Oral Tab TAKE 1 TABLET(100 MG) BY MOUTH DAILY 90 tablet 1    metroNIDAZOLE 0.75 % External Gel Apply 1 g topically 2 (two) times daily. Apply to face twice daily 45 g 5    Clindamycin Phosphate 1 % External Gel Apply 1 Application topically 2 (two) times  daily. Apply to scalp twice daily 30 g 5    COQ10 OR Take 1 tablet by mouth daily.      GARLIC OR Take 1 tablet by mouth daily.      CALCIUM OR Take 1 tablet by mouth daily as needed.      Lovastatin 20 MG Oral Tab Take 1 tablet (20 mg total) by mouth daily. 90 tablet 1       Allergies:  No Known Allergies      ROS:   Review of Systems   Musculoskeletal:         +right shoulder pain     Otherwise see HPI    PHYSICAL EXAM:   /80   Pulse 82   Resp 18   Ht 5' 2.5\" (1.588 m)   Wt 168 lb (76.2 kg)   SpO2 96%   BMI 30.24 kg/m²  Estimated body mass index is 30.24 kg/m² as calculated from the following:    Height as of this encounter: 5' 2.5\" (1.588 m).    Weight as of this encounter: 168 lb (76.2 kg).   Vital signs reviewed.Appears stated age, well groomed.    Physical Exam  Constitutional:       General: She is not in acute distress.  Musculoskeletal:      Comments: +No significant tenderness to palpation of shoulder. ROM of shoulder intact. Negative supraspinatus and subscapularis lift-off test but there is pain but not fiona weakness with infraspinatus test. Negative Hawkin's and Neer's.      Neurological:      Mental Status: She is alert.           ASSESSMENT/PLAN:     54 year old female presenting for right shoulder pain. May have element of both rotator cuff pathology and nerve entrapment/irritation.    1. Acute pain of right shoulder  - provided home physical therapy exercises  - XR SHOULDER, COMPLETE (MIN 2 VIEWS), RIGHT (CPT=73030); Future  - if x-ray unremarkable and home exercises and medrol not helping patient can f/u with in-person physical therapy, if pain worsening at any time can order f/u shoulder MRI  - Physical Therapy Referral - [facility]  - methylPREDNISolone (MEDROL) 4 MG Oral Tablet Therapy Pack; As directed.  Dispense: 21 each; Refill: 0    Follow-up: as needed    Outcome: Patient verbalizes understanding. Patient is notified to call with any questions, complications, allergies, or  worsening or changing symptoms.  Patient is to call with any side effects or complications from the treatments as a result of today.     Total length of visit/charting: approximately 24 min    Praveen Granger MD, 08/20/24, 11:08 AM      Please note that portions of this note may have been completed with a voice recognition program. Efforts were made to edit the dictations but occasionally words are mis-transcribed.

## 2024-08-22 ENCOUNTER — OFFICE VISIT (OUTPATIENT)
Dept: PHYSICAL THERAPY | Age: 54
End: 2024-08-22
Attending: STUDENT IN AN ORGANIZED HEALTH CARE EDUCATION/TRAINING PROGRAM
Payer: COMMERCIAL

## 2024-08-22 DIAGNOSIS — M72.2 PLANTAR FASCIITIS, LEFT: ICD-10-CM

## 2024-08-22 DIAGNOSIS — M72.2 PLANTAR FASCIITIS, RIGHT: Primary | ICD-10-CM

## 2024-08-22 DIAGNOSIS — E78.2 MIXED HYPERLIPIDEMIA: ICD-10-CM

## 2024-08-22 PROCEDURE — 97110 THERAPEUTIC EXERCISES: CPT

## 2024-08-22 PROCEDURE — 97161 PT EVAL LOW COMPLEX 20 MIN: CPT

## 2024-08-22 PROCEDURE — 97530 THERAPEUTIC ACTIVITIES: CPT

## 2024-08-22 RX ORDER — LOVASTATIN 20 MG
20 TABLET ORAL DAILY
Qty: 90 TABLET | Refills: 1 | Status: SHIPPED | OUTPATIENT
Start: 2024-08-22

## 2024-08-22 NOTE — PROGRESS NOTES
LOWER EXTREMITY EVALUATION:     Diagnosis:    Plantar fasciitis, right (M72.2)  Plantar fasciitis, left (M72.2)      Referring Provider: Leandro Byrne  Date of Evaluation:    8/22/2024    Precautions:  HTN and hypothyroidism  Next MD visit:   none scheduled  Date of Surgery: n/a     PATIENT SUMMARY   Indiana Coello is a 54 year old female who presents to therapy today with complaints of B heel pain. L shin also painful, near super and infer fibular attachment points. Pain loc at heel B. Pt reports burning and numbness intermittently. Pain is intermittent, some days fine, other days intense. Pain immediately in morning, walking in sandals improves pain, walking at work brings pain on the most, walking in sandals at home mild alleviates pain. Walking on bare feet is painful.  Pt works at [employer], that entails high frequency of weight bearing.   Pt describes pain level current 0/10, at best 0/10, at worst 8/10.   Current functional limitations include painful amb, stairs, driving in car.     Indiana describes prior level of function non painful ADLs.   Past medical history was reviewed with [place]. Significant findings include Pt has bunions under B big toes at MTP joint line.     ASSESSMENT  Indiana presents to physical therapy evaluation with primary c/o B heel pain. The results of the objective tests and measures show: sub optimal toe position at rest; diminished AROM, strength, joint mobility strength and gait mechanics.  Functional deficits include but are not limited to painful amb, stairs, driving in car.  Signs and symptoms are consistent with diagnosis of B plantar fasciitis. Pt and PT discussed evaluation findings, pathology, POC and HEP.  Pt voiced understanding and performs HEP correctly without reported pain. Skilled Physical Therapy is medically necessary to address the above impairments and reach functional goals.     OBJECTIVE:   Observation: B big toes lat deviated into abd.   Palpation: NO TTP    Sensation: in tact sharp and light sensation.     AROM: (* denotes performed with pain)  Hip Knee Foot/Ankle   Flexion: R WNL; L WNL  ER: R WNL; L WNL  IR: R WNL; L WNL Flexion: R WNL; L WNL  Extension: R WNL; L WNL    DF: R 20; L 20  PF: R 45; L 45  INV: R 30; L 30  EV: R 20; L 20  Great toe ext: R 35; L 35     Accessory motion:   TC post glide: mod R; mild L  TC Ant glide: mod R; mild L  Great toe MTP: severe R; mod L     Strength/MMT: (* denotes performed with pain)  Hip Knee Foot/Ankle   Flexion: R 5/5; L 5/5  ER: R 5/5; L 5/5  IR: R 5/5; L 5/5 Flexion: R 5/5; L 5/5  Extension: R 5/5; L 5/5    DF: R 4/5; L 4/5  PF: R 4+/5; L 4+/5  INV: R 4-/5; L 4-/5  EV: R 4-/5; L 4-/5  Great toe ext: R 3+/5; L 3+/5     Special tests:   Ant drawer: Negative  Subtalar mobility: Negative     Gait: pt ambulates on level ground with pt ambulates w/ allyssa on lateral portions of feet greater than med.   Balance: SLS: R 30 sec, L 30 sec    Today’s Treatment and Response:   Pt education was provided on exam findings, treatment diagnosis, treatment plan, expectations, and prognosis. Pt was also provided recommendations for activity modifications, possible soreness after evaluation, and importance of remaining active.  Patient was instructed in and issued a HEP for:   Access Code: [email or web address]  URL: [email or web address]  Date: 08/22/2024  Prepared by: Pee Mckay    Exercises  - Towel Scrunches  - 1 x daily - 7 x weekly - 3 sets - 5 reps  - Gastroc Stretch on Wall  - 1 x daily - 7 x weekly - 3 sets - 1 reps - 30 hold  - Seated Plantar Fascia Mobilization with Small Ball  - 1 x daily - 7 x weekly - 3 sets - 5 reps  - Seated Plantar Fascia Stretch  - 1 x daily - 7 x weekly - 3 sets - 3 reps - 30 hold    Charges: PT Eval Low Complexity      Total Timed Treatment: Eval-20, TA-13, TE-14 min     Total Treatment Time: 47 min     Based on clinical rationale and outcome measures, this evaluation involved Low Complexity decision  making due to 1-2 personal factors/comorbidities, 4+ body structures involved/activity limitations, and unstable symptoms including changing pain levels.  PLAN OF CARE:   Goals: (to be met in 10 visits)  Pt will improve resting toe position to WNL to sitting in car w/out pain.  Pt will improve ankle strength to grossly 5/5 to amb w/out pain.  Pt will improve ankle ROM to equal B to negotiate stairs w/out pain.   Pt will improve ankle joint mobility to WNL to return all ADLS pain free.     Frequency / Duration: Patient will be seen for 2 x/week or a total of 10 visits over a 5 week period. Treatment will include: Gait training, Manual Therapy, Neuromuscular Re-education, Therapeutic Activities, Therapeutic Exercise, and Home Exercise Program instruction    Education or treatment limitation: None  Rehab Potential:good    LEFS Score  LEFS Score: 90 % (8/15/2024  9:25 AM)      Patient/Family/Caregiver was advised of these findings, precautions, and treatment options and has agreed to actively participate in planning and for this course of care.    Thank you for your referral. Please co-sign or sign and return this letter via fax as soon as possible to [phone]. If you have any questions, please contact me at Dept: [phone]    Sincerely,  Electronically signed by therapist: Loi Mckay  Physician's certification required: Yes  I certify the need for these services furnished under this plan of treatment and while under my care.    X___________________________________________________ Date____________________    Certification From: 8/22/2024  To:11/20/2024

## 2024-08-22 NOTE — TELEPHONE ENCOUNTER
Last office visit: 8/20/2024   Protocol: pass  Requested medication(s) are due for refill today: yes  Requested medication(s) are on the active medication list same strength, form, dose/ sig: yes  Requested medication(s) are managed by provider: yes  Patient has already received a courtsey refill: no     NOV: none   Last Labs: 5/24/2024  Asked to Return: as needed

## 2024-08-23 ENCOUNTER — OFFICE VISIT (OUTPATIENT)
Facility: CLINIC | Age: 54
End: 2024-08-23
Payer: COMMERCIAL

## 2024-08-23 VITALS
SYSTOLIC BLOOD PRESSURE: 142 MMHG | HEART RATE: 82 BPM | DIASTOLIC BLOOD PRESSURE: 82 MMHG | HEIGHT: 62.5 IN | BODY MASS INDEX: 30.08 KG/M2 | WEIGHT: 167.63 LBS

## 2024-08-23 DIAGNOSIS — D21.9 FIBROIDS: ICD-10-CM

## 2024-08-23 DIAGNOSIS — Z12.31 ENCOUNTER FOR SCREENING MAMMOGRAM FOR MALIGNANT NEOPLASM OF BREAST: ICD-10-CM

## 2024-08-23 DIAGNOSIS — Z12.4 SCREENING FOR CERVICAL CANCER: ICD-10-CM

## 2024-08-23 DIAGNOSIS — R19.8 ABDOMINAL FULLNESS IN RIGHT LOWER QUADRANT: ICD-10-CM

## 2024-08-23 DIAGNOSIS — Z01.411 ENCOUNTER FOR WELL WOMAN EXAM WITH ABNORMAL FINDINGS: Primary | ICD-10-CM

## 2024-08-23 DIAGNOSIS — N76.3 CHRONIC VULVITIS: ICD-10-CM

## 2024-08-23 PROCEDURE — 99459 PELVIC EXAMINATION: CPT | Performed by: OBSTETRICS & GYNECOLOGY

## 2024-08-23 PROCEDURE — 88175 CYTOPATH C/V AUTO FLUID REDO: CPT | Performed by: OBSTETRICS & GYNECOLOGY

## 2024-08-23 PROCEDURE — 3008F BODY MASS INDEX DOCD: CPT | Performed by: OBSTETRICS & GYNECOLOGY

## 2024-08-23 PROCEDURE — 99396 PREV VISIT EST AGE 40-64: CPT | Performed by: OBSTETRICS & GYNECOLOGY

## 2024-08-23 PROCEDURE — 87624 HPV HI-RISK TYP POOLED RSLT: CPT | Performed by: OBSTETRICS & GYNECOLOGY

## 2024-08-23 PROCEDURE — 3077F SYST BP >= 140 MM HG: CPT | Performed by: OBSTETRICS & GYNECOLOGY

## 2024-08-23 PROCEDURE — 3079F DIAST BP 80-89 MM HG: CPT | Performed by: OBSTETRICS & GYNECOLOGY

## 2024-08-26 ENCOUNTER — TELEPHONE (OUTPATIENT)
Dept: GASTROENTEROLOGY | Age: 54
End: 2024-08-26

## 2024-08-26 LAB — HPV E6+E7 MRNA CVX QL NAA+PROBE: NEGATIVE

## 2024-08-26 NOTE — PROGRESS NOTES
Diagnosis:    Plantar fasciitis, right (M72.2)  Plantar fasciitis, left (M72.2)         Referring Provider: Leandro Byrne  Date of Evaluation:    8/22/2024     Precautions:  HTN and Hypothyroidism  Next MD visit:   none scheduled  Date of Surgery: n/a   Insurance Primary/Secondary: [employer] [place] / N/A     # Auth Visits: 5 auth, 10 POC            Subjective: Pt reports having to leave work early on the day of initial eval because of pain in thighs, knees and calves. However, pain resolved in the following days.     Pain: 0/10 in B feet.       Objective:   B 1st MTP dorsal glide: severe hypomobility.     Assessment: All joint mobes hypomobile. Pt reported mod fatigue during tib ant and deep PF strengthening. Pt's greatest defects are joint mobility and strength.       Goals: (to be met in 10 visits)  Pt will improve resting toe position to WNL to sitting in car w/out pain.  Pt will improve ankle strength to grossly 5/5 to amb w/out pain.  Pt will improve ankle ROM to equal B to negotiate stairs w/out pain.   Pt will improve ankle joint mobility to WNL to return all ADLS pain free.     Plan: Assess response to 1st full treatment.   Date: 8/27/2024  TX#: 2/5 Date:                 TX#: 3/ Date:                 TX#: 4/ Date:                 TX#: 5/ Date:   Tx#: 6/   Upright bike-8 min        TC and and post glides grade 3 B-10 min  1st MTP dorsal glide grade 3-4 min        Great toe isometric ADD x 15, 5 sec hold  Tib machine no weight 2 x 30   Seated calf raise no weight 2 x 30        Slant board straight and bent knees x 60 sec ea  Balance board staggered A/P, M/L x 60 sec ea       HEP:   Access Code: [email or web address]  URL: [email or web address]  Date: 08/22/2024  Prepared by: Pee Mckay     Exercises  - Towel Scrunches  - 1 x daily - 7 x weekly - 3 sets - 5 reps  - Gastroc Stretch on Wall  - 1 x daily - 7 x weekly - 3 sets - 1 reps - 30 hold  - Seated Plantar Fascia Mobilization with Small Ball  - 1 x  daily - 7 x weekly - 3 sets - 5 reps  - Seated Plantar Fascia Stretch  - 1 x daily - 7 x weekly - 3 sets - 3 reps - 30 hold    Charges: M, NM, TE       Total Timed Treatment: M-14, NM-10, TE-20 min  Total Treatment Time: 44 min

## 2024-08-27 ENCOUNTER — OFFICE VISIT (OUTPATIENT)
Dept: PHYSICAL THERAPY | Age: 54
End: 2024-08-27
Attending: STUDENT IN AN ORGANIZED HEALTH CARE EDUCATION/TRAINING PROGRAM
Payer: COMMERCIAL

## 2024-08-27 PROCEDURE — 97112 NEUROMUSCULAR REEDUCATION: CPT

## 2024-08-27 PROCEDURE — 97110 THERAPEUTIC EXERCISES: CPT

## 2024-08-27 PROCEDURE — 97140 MANUAL THERAPY 1/> REGIONS: CPT

## 2024-08-28 ENCOUNTER — HOSPITAL ENCOUNTER (OUTPATIENT)
Dept: GENERAL RADIOLOGY | Age: 54
Discharge: HOME | End: 2024-08-28
Attending: STUDENT IN AN ORGANIZED HEALTH CARE EDUCATION/TRAINING PROGRAM
Payer: COMMERCIAL

## 2024-08-28 DIAGNOSIS — M25.511 ACUTE PAIN OF RIGHT SHOULDER: ICD-10-CM

## 2024-08-28 PROCEDURE — 73030 X-RAY EXAM OF SHOULDER: CPT | Performed by: STUDENT IN AN ORGANIZED HEALTH CARE EDUCATION/TRAINING PROGRAM

## 2024-08-29 ENCOUNTER — APPOINTMENT (OUTPATIENT)
Dept: PHYSICAL THERAPY | Age: 54
End: 2024-08-29
Attending: STUDENT IN AN ORGANIZED HEALTH CARE EDUCATION/TRAINING PROGRAM
Payer: COMMERCIAL

## 2024-08-29 LAB
.: NORMAL
.: NORMAL

## 2024-09-03 ENCOUNTER — PATIENT MESSAGE (OUTPATIENT)
Dept: FAMILY MEDICINE CLINIC | Facility: CLINIC | Age: 54
End: 2024-09-03

## 2024-09-03 DIAGNOSIS — Z12.11 COLON CANCER SCREENING: Primary | ICD-10-CM

## 2024-09-03 NOTE — TELEPHONE ENCOUNTER
From: Indiana Coello  To: Praveen Granger  Sent: 9/3/2024 9:31 AM CDT  Subject: Colonoscopy      The colonoscopy I'm getting I'm getting Oct. 16th requires a referral since it's in a place not related to [employer] insurance. Can I get a referral?

## 2024-09-03 NOTE — PROGRESS NOTES
Diagnosis:    Plantar fasciitis, right (M72.2)  Plantar fasciitis, left (M72.2)         Referring Provider: Leandro Byrne  Date of Evaluation:    8/22/2024     Precautions:  HTN and Hypothyroidism  Next MD visit:   none scheduled  Date of Surgery: n/a   Insurance Primary/Secondary: [employer] / N/A     # Auth Visits: 5 auth, 10 POC            Subjective: Pt reports new pain heel on R foot, but pain in other areas of feet has improved.     Pain: 6/10 in L heel after approx 6 hours of walking, 0/10 currently.       Objective:   L foot: mod hypo TC and 1st MTP  R Foot: mod hypo TC nad 1st MTP     Assessment: Pt displayed mild med/lat sway during SLS on AX suggesting an appropriate degree of difficulty. Pt's joint mobility improved to moderate, which is likely contributing to the mild improvements in sxs.        Goals: (to be met in 10 visits)  Pt will improve resting toe position to WNL to sitting in car w/out pain.  Pt will improve ankle strength to grossly 5/5 to amb w/out pain.  Pt will improve ankle ROM to equal B to negotiate stairs w/out pain.   Pt will improve ankle joint mobility to WNL to return all ADLS pain free.     Plan: Add heel and toe walking.   Date: 8/27/2024  TX#: 2/5 Date: 9/4/24                TX#: 3/5 Date:                 TX#: 4/ Date:                 TX#: 5/ Date:   Tx#: 6/   Upright bike-8 min  Nu step-6 min       TC and and post glides grade 3 B-10 min  1st MTP dorsal glide grade 3-4 min  TC and and post glides grade 3 B-6 min  1st MTP dorsal and plantar glides grade 3 B-8 min       Great toe isometric ADD x 15, 5 sec hold  Tib machine no weight 2 x 30   Seated calf raise no weight 2 x 30  Towel scrunches B-6 min  Inve/Ever ankle RTB x 20 ea   Tib machine 10# 2 x 15   Seated calf raise 10# 2 x 15      Slant board straight and bent knees x 60 sec ea  Balance board staggered A/P, M/L x 60 sec ea SLS balance AX x 60 sec ea       HEP:   Access Code: [email or web address]  URL:  [email or web address]  Date: 08/22/2024  Prepared by: Pee Mckay     Exercises  - Towel Scrunches  - 1 x daily - 7 x weekly - 3 sets - 5 reps  - Gastroc Stretch on Wall  - 1 x daily - 7 x weekly - 3 sets - 1 reps - 30 hold  - Seated Plantar Fascia Mobilization with Small Ball  - 1 x daily - 7 x weekly - 3 sets - 5 reps  - Seated Plantar Fascia Stretch  - 1 x daily - 7 x weekly - 3 sets - 3 reps - 30 hold    Charges: M, NM, TE       Total Timed Treatment: M-14, NM-10, TE-23 min  Total Treatment Time: 47 min

## 2024-09-04 ENCOUNTER — OFFICE VISIT (OUTPATIENT)
Dept: PHYSICAL THERAPY | Age: 54
End: 2024-09-04
Attending: STUDENT IN AN ORGANIZED HEALTH CARE EDUCATION/TRAINING PROGRAM
Payer: COMMERCIAL

## 2024-09-04 PROCEDURE — 97112 NEUROMUSCULAR REEDUCATION: CPT

## 2024-09-04 PROCEDURE — 97110 THERAPEUTIC EXERCISES: CPT

## 2024-09-04 PROCEDURE — 97140 MANUAL THERAPY 1/> REGIONS: CPT

## 2024-09-09 ENCOUNTER — PATIENT MESSAGE (OUTPATIENT)
Dept: FAMILY MEDICINE CLINIC | Facility: CLINIC | Age: 54
End: 2024-09-09

## 2024-09-09 NOTE — TELEPHONE ENCOUNTER
From: Indiana Coello  To: Praveen Granger  Sent: 9/9/2024 10:10 AM CDT  Subject: Hypertension 2     Well, this may be urgent, but I didn't want to just schedule an appt. without giving my symptoms first. So my blood pressure has gone up to Hypertension 2 for 2 days, then last night after taking my medication it went down to normal. Then this morning I showed at Hypertension 1. So what would you like me to do?

## 2024-09-10 ENCOUNTER — OFFICE VISIT (OUTPATIENT)
Dept: PHYSICAL THERAPY | Age: 54
End: 2024-09-10
Attending: STUDENT IN AN ORGANIZED HEALTH CARE EDUCATION/TRAINING PROGRAM
Payer: COMMERCIAL

## 2024-09-10 PROCEDURE — 97112 NEUROMUSCULAR REEDUCATION: CPT

## 2024-09-10 PROCEDURE — 97110 THERAPEUTIC EXERCISES: CPT

## 2024-09-10 PROCEDURE — 97530 THERAPEUTIC ACTIVITIES: CPT

## 2024-09-10 NOTE — PROGRESS NOTES
Diagnosis:    Plantar fasciitis, right (M72.2)  Plantar fasciitis, left (M72.2)          Referring Provider: Leandro Byrne  Date of Evaluation:    8/22/2024     Precautions:  HTN and Hypothyroidism  Next MD visit:   none scheduled  Date of Surgery: n/a       Progress Summary  Pt has attended 5, cancelled 0, and no shown 0 visits in Physical Therapy.     Subjective: The pt reports L heel pain continues to bother her. The pt intermittently notices pain when working and performing ADLs. Pain ranges from 0/10 to 3-4/10.     Assessment: Pt has made improvements to physical impairments including: AROM, strength and gait mechanics. However, the pt continues to display deficits in physical impairments such as joint motion and pain. The pt also mentioned mild pain 3/10 intermittently during work, walking, stairs and prolonged standing. Therefore, the pt can continue to benefit from skilled physical therapy, and safe return to PLOF will not be achieved without continued intervention.      Objective:   Observation: B big toes lat deviated into abd. New: B big toes lat deviated into abd  Palpation: NO TTP New: No TTP  Sensation: in tact sharp and light sensation.      AROM: (* denotes performed with pain)  Hip Knee Foot/Ankle New Foot/Ankle   Flexion: R WNL; L WNL  ER: R WNL; L WNL  IR: R WNL; L WNL Flexion: R WNL; L WNL  Extension: R WNL; L WNL    DF: R 20; L 20  PF: R 45; L 45  INV: R 30; L 30  EV: R 20; L 20  Great toe ext: R 35; L 35   INV: 40 B          Accessory motion:   TC post glide: mod R; mild L  New: Mild R, WNL L  TC Ant glide: mod R; mild L   New: Mild B  Great toe MTP: severe R; mod L   New: Mild R, WNL L      Strength/MMT: (* denotes performed with pain)  Hip Knee Foot/Ankle New ankle/Foot   Flexion: R 5/5; L 5/5  ER: R 5/5; L 5/5  IR: R 5/5; L 5/5 Flexion: R 5/5; L 5/5  Extension: R 5/5; L 5/5    DF: R 4/5; L 4/5  PF: R 4+/5; L 4+/5  INV: R 4-/5; L 4-/5  EV: R 4-/5; L 4-/5  Great toe ext: R 3+/5; L 3+/5 DF:  5/5 B  PF: 5/5 B  INV: 5/5 B  Ev: 5/5 B  Great toe ext: 5/5 B      Special tests:   Ant drawer: Negative  Subtalar mobility: Negative      Gait: pt ambulates on level ground with pt ambulates w/ allyssa on lateral portions of feet greater than med.   New: WNL  Balance: SLS: R 30 sec, L 30 sec    Goals: (to be met in 10 visits)  Pt will improve resting toe position to WNL to sitting in car w/out pain.In progress  Pt will improve ankle strength to grossly 5/5 to amb w/out pain.Met  Pt will improve ankle ROM to equal B to negotiate stairs w/out pain.Met   Pt will improve ankle joint mobility to WNL to return all ADLS pain free.In progress       Additional Interventions:  SLS ant/post weight shift x 10 ea  SLS rotation and side reaching x 10 ea  SLS AX EC 2 x 30 sec ea  Step downs 6in box x 20 ea   Tib machine 40# 3 x 10  Seated calf raise machine 40# 3 x 10   Slant board knee straight and bent 2 x 60 ea     Charges: TE, TA-2, NM;  TE-12, TA-25, NM-10; Total 47 min     Rehab Potential: excellent    Plan: Continue skilled Physical Therapy 2 x/week or a total of 4 visits over a 2 week period. Treatment will include: manual therapy, neuromuscular re-education, therapeutic exercise, stair negotiation and HEP        Patient/Family/Caregiver was advised of these findings, precautions, and treatment options and has agreed to actively participate in planning and for this course of care.    Thank you for your referral. If you have any questions, please contact me at Dept: [phone].    Sincerely,  Electronically signed by therapist: Loi Mckay    Physician's certification required: Yes  Please co-sign or sign and return this letter via fax as soon as possible to [phone].   I certify the need for these services furnished under this plan of treatment and while under my care.    X___________________________________________________ Date____________________    Certification From: 9/10/2024  To:12/9/2024

## 2024-09-12 ENCOUNTER — OFFICE VISIT (OUTPATIENT)
Dept: PHYSICAL THERAPY | Age: 54
End: 2024-09-12
Attending: STUDENT IN AN ORGANIZED HEALTH CARE EDUCATION/TRAINING PROGRAM
Payer: COMMERCIAL

## 2024-09-12 PROCEDURE — 97110 THERAPEUTIC EXERCISES: CPT

## 2024-09-12 PROCEDURE — 97530 THERAPEUTIC ACTIVITIES: CPT

## 2024-09-12 PROCEDURE — 97112 NEUROMUSCULAR REEDUCATION: CPT

## 2024-09-12 NOTE — PROGRESS NOTES
Diagnosis:    Plantar fasciitis, right (M72.2)  Plantar fasciitis, left (M72.2)          Referring Provider: Leandro Byrne  Date of Evaluation:    8/22/2024     Precautions:  HTN and Hypothyroidism  Next MD visit:   none scheduled  Date of Surgery: n/a         Progress Summary  Pt has attended 5, cancelled 0, and no shown 0 visits in Physical Therapy.     Subjective: Pt reports pain has not bothered during the last couple of days, but pain is mild after work and when walking barefoot. Pt is able to perform all ADLS, but when pain comes on all weight bearing activity is painful. Pain currently 0/10, 4/10 after work, this morning 8/10.    Assessment: Pt has mad improvements to AROM, joint mobility, strength and gait mechanics. However, deficits continue to be present including: Inversion AROM, TC post joint mobility, pain and activity tolerance. Pt's predominate functional limitations are walking at home and pain after working. The previously mentioned deficits and limitations will not improve without further intervention, and may develop int more sever complications if left untreated. Therefore, skilled physical therapy is medically necessary to sallow the pat tos safely return to PLOF.        Objective:   Observation: B big toes lat deviated into abd. New: no change in resting position.   Palpation: NO TTP   Sensation: in tact sharp and light sensation.      AROM: (* denotes performed with pain)  Hip Knee Foot/Ankle New   Flexion: R WNL; L WNL  ER: R WNL; L WNL  IR: R WNL; L WNL Flexion: R WNL; L WNL  Extension: R WNL; L WNL    DF: R 20; L 20  PF: R 45; L 45  INV: R 30; L 30  EV: R 20; L 20  Great toe ext: R 35; L 35 Inv: R 35, L 38      Accessory motion:   TC post glide: mod R; mild L   New: mild hypo B  TC Ant glide: mod R; mild L     New: WNL  Great toe MTP: severe R; mod L New: WNL      Strength/MMT: (* denotes performed with pain)  Hip Knee Foot/Ankle New Foot/Ankle   Flexion: R 5/5; L 5/5  ER: R 5/5; L  5/5  IR: R 5/5; L 5/5 Flexion: R 5/5; L 5/5  Extension: R 5/5; L 5/5    DF: R 4/5; L 4/5  PF: R 4+/5; L 4+/5  INV: R 4-/5; L 4-/5  EV: R 4-/5; L 4-/5  Great toe ext: R 3+/5; L 3+/5 Globally 5/5      Special tests:   Ant drawer: Negative  Subtalar mobility: Negative      Gait: pt ambulates on level ground with pt ambulates w/ allyssa on lateral portions of feet greater than med.   New: WNL  Balance: SLS: R 30 sec, L 30 sec    Interventions:  Barefoot PF 2 x 30   Barefoot toe walking  Barefoot heel walking  Barefoot agility ladder: ant, lat, post   Barefoot SL shuttle PF 4C 2 x 20 ea  Barefoot hurdles: ant and lat   SLS AX EC 2 x 60 sec ea  Self-myofacial PF release        Charges: TA, TE-2, NM;  TA-11, TE-27, NM-13;  Total: 51    Goals: (to be met in 10 visits)  Pt will improve resting toe position to WNL to sitting in car w/out pain.In progress  Pt will improve ankle strength to grossly 5/5 to amb w/out pain.Met  Pt will improve ankle ROM to equal B to negotiate stairs w/out pain. In progress  Pt will improve ankle joint mobility to WNL to return all ADLS pain free.In progress      Rehab Potential: excellent    Plan: Continue skilled Physical Therapy 2 x/week or a total of 4 visits over a 2 week period. Treatment will include: Stretching, strengthening, balance and manual therapy.        Patient/Family/Caregiver was advised of these findings, precautions, and treatment options and has agreed to actively participate in planning and for this course of care.    Thank you for your referral. If you have any questions, please contact me at Dept: [phone].    Sincerely,  Electronically signed by therapist: Loi Mckay    Physician's certification required: Yes  Please co-sign or sign and return this letter via fax as soon as possible to [phone].   I certify the need for these services furnished under this plan of treatment and while under my care.    X___________________________________________________  Date____________________    Certification From: 9/12/2024  To:12/11/2024

## 2024-09-24 ENCOUNTER — TELEPHONE (OUTPATIENT)
Dept: FAMILY MEDICINE CLINIC | Facility: CLINIC | Age: 54
End: 2024-09-24

## 2024-10-03 ENCOUNTER — OFFICE VISIT (OUTPATIENT)
Dept: PHYSICAL THERAPY | Age: 54
End: 2024-10-03
Attending: STUDENT IN AN ORGANIZED HEALTH CARE EDUCATION/TRAINING PROGRAM
Payer: COMMERCIAL

## 2024-10-03 PROCEDURE — 97530 THERAPEUTIC ACTIVITIES: CPT

## 2024-10-03 PROCEDURE — 97110 THERAPEUTIC EXERCISES: CPT

## 2024-10-03 PROCEDURE — 97140 MANUAL THERAPY 1/> REGIONS: CPT

## 2024-10-03 NOTE — PROGRESS NOTES
Diagnosis:   Plantar fasciitis, right (M72.2)  Plantar fasciitis, left (M72.2)       Referring Provider: Leandro Byrne  Date of Evaluation:     8/22/2024     Precautions:  HTN and Hypothyroidism  Next MD visit:   none scheduled  Date of Surgery: n/a   Insurance Primary/Secondary: [employer] [place]MO / N/A     # Auth Visits: 4            Subjective: Pt reports the feet have been bothering her after prolonged periods of Wbing.     Pain:  0/10 currently, 5/10 some days after work.       Objective:   SLS EC: 7 sec       Assessment: Pt visibly struggled during SLS EC implying balance dysfunction, that may be contributing to sxs. Pt reported fatigue early during sets of DF and calf raises implying low mm endurance, which may also be contributing to pain after prolonged Wbing.       Goals: (to be met in 10 visits)  Pt will improve resting toe position to WNL to sitting in car w/out pain.  Pt will improve ankle strength to grossly 5/5 to amb w/out pain.  Pt will improve ankle ROM to equal B to negotiate stairs w/out pain.   Pt will improve ankle joint mobility to WNL to return all ADLS pain free.     Plan: Assess response to adding new HEP EX  Date: 10/3/2024  TX#: 1/4 Date:                 TX#: 3/ Date:                 TX#: 4/ Date:                 TX#: 5/ Date:   Tx#: 6/   Upright bike-8 min        TC ant and post glides garde 3-4-7 min   Subtalar med/lat glides grade 3-4 -4 min        Seated ever and iver strength GTB x 20 ea  Taught new HEP EX and demonstrated w/ pt-11 min        Tib machine 40# 3 x 15  Calf raise on leg press 75# x 12  SLS EC x 120 ea  Self myofascial release  Slant board knees bent and straight x 90 sec ea       HEP: Access Code: [email or web address]  URL: [email or web address]  Date: 08/22/2024  Prepared by: Pee Mckay     Exercises  - Towel Scrunches  - 1 x daily - 7 x weekly - 3 sets - 5 reps  - Gastroc Stretch on Wall  - 1 x daily - 7 x weekly - 3 sets - 1 reps - 30 hold  - Seated Plantar  Fascia Mobilization with Small Ball  - 1 x daily - 7 x weekly - 3 sets - 5 reps  - Seated Plantar Fascia Stretch  - 1 x daily - 7 x weekly - 3 sets - 3 reps - 30 hold    Charges: M, TA, TE-3       Total Timed Treatment: M-11, TA-10, TE-42 min  Total Treatment Time: 63 min

## 2024-10-10 ENCOUNTER — OFFICE VISIT (OUTPATIENT)
Dept: PHYSICAL THERAPY | Age: 54
End: 2024-10-10
Attending: STUDENT IN AN ORGANIZED HEALTH CARE EDUCATION/TRAINING PROGRAM
Payer: COMMERCIAL

## 2024-10-10 PROCEDURE — 97110 THERAPEUTIC EXERCISES: CPT

## 2024-10-10 PROCEDURE — 97112 NEUROMUSCULAR REEDUCATION: CPT

## 2024-10-14 ENCOUNTER — OFFICE VISIT (OUTPATIENT)
Dept: PHYSICAL THERAPY | Age: 54
End: 2024-10-14
Attending: STUDENT IN AN ORGANIZED HEALTH CARE EDUCATION/TRAINING PROGRAM
Payer: COMMERCIAL

## 2024-10-14 PROCEDURE — 97530 THERAPEUTIC ACTIVITIES: CPT

## 2024-10-14 PROCEDURE — 97110 THERAPEUTIC EXERCISES: CPT

## 2024-10-14 NOTE — PROGRESS NOTES
Diagnosis:   Plantar fasciitis, right (M72.2)  Plantar fasciitis, left (M72.2)        Referring Provider: Leandro Byrne  Date of Evaluation:     8/22/2024     Precautions:  HTN and Hypothyroidism  Next MD visit:   none scheduled  Date of Surgery: n/a   Insurance Primary/Secondary: [employer] / N/A     # Auth Visits: 4       Discharge Summary  Pt has attended 8 visits in Physical Therapy.     Subjective: Pt reports the feet have been much better, only minor pain after long days of being on her feet that alleviates relatively quickly.     Assessment: The pt has improved all physical impairments and functional limitations. The pt has demonstrated adequate understanding of HEP, and can continue to maintain progress made in PT to continue performing all ADLs w/ min to zero pain.     Objective:      AROM: (* denotes performed with pain)  Hip Knee Foot/Ankle NEW Foot   Flexion: R WNL; L WNL  ER: R WNL; L WNL  IR: R WNL; L WNL Flexion: R WNL; L WNL  Extension: R WNL; L WNL    DF: R 20; L 20  PF: R 45; L 45  INV: R 30; L 30  EV: R 20; L 20  Great toe ext: R 35; L 35 DF: 22 R; 22 L  PF: 52 R; 54 L  INV: 35 R; 41 L  Emmy: 24 R; 26 L        Accessory motion:   TC post glide: mod R; mild L New: WNL   TC Ant glide: mod R; mild L  New: WNL   Great toe MTP: severe R; mod L  New: WNL      Strength/MMT: (* denotes performed with pain)  Hip Knee Foot/Ankle New Ankle/Foot   Flexion: R 5/5; L 5/5  ER: R 5/5; L 5/5  IR: R 5/5; L 5/5 Flexion: R 5/5; L 5/5  Extension: R 5/5; L 5/5    DF: R 4/5; L 4/5  PF: R 4+/5; L 4+/5  INV: R 4-/5; L 4-/5  EV: R 4-/5; L 4-/5  Great toe ext: R 3+/5; L 3+/5 All WNL      Special tests:   Ant drawer: Negative  Subtalar mobility: Negative      Gait: pt ambulates on level ground with pt ambulates w/ allyssa on lateral portions of feet greater than med.  Gait: WNL   Balance: SLS: R 30 sec, L 30 sec       Goals: (to be met in 10 visits)  Pt will improve resting toe position to WNL to sitting in car w/out  pain.-met  Pt will improve ankle strength to grossly 5/5 to amb w/out pain.-met  Pt will improve ankle ROM to equal B to negotiate stairs w/out pain.-met   Pt will improve ankle joint mobility to WNL to return all ADLS pain free.-met    Charges: TA-2, TE;  TA-31, TE-11;  Total: 42 min     Patient/Family/Caregiver was advised of these findings, precautions, and treatment options and has agreed to actively participate in planning and for this course of care.    Thank you for your referral. If you have any questions, please contact me at Dept: [phone].    Sincerely,  Electronically signed by therapist: Loi Mckay    Physician's certification required: No  Please co-sign or sign and return this letter via fax as soon as possible to [phone].   I certify the need for these services furnished under this plan of treatment and while under my care.    X___________________________________________________ Date____________________    Certification From: 10/14/2024  To:1/12/2025

## 2024-10-16 ENCOUNTER — APPOINTMENT (OUTPATIENT)
Dept: GASTROENTEROLOGY | Age: 54
End: 2024-10-16
Attending: INTERNAL MEDICINE

## 2024-11-04 PROBLEM — Z12.11 SPECIAL SCREENING FOR MALIGNANT NEOPLASM OF COLON: Status: ACTIVE | Noted: 2024-11-04

## 2024-12-02 ENCOUNTER — TELEPHONE (OUTPATIENT)
Dept: FAMILY MEDICINE CLINIC | Facility: CLINIC | Age: 54
End: 2024-12-02

## 2024-12-02 NOTE — TELEPHONE ENCOUNTER
Pt reports her BP normally runs 130s/90s, was 145/98 this morning  Had pt recheck BP while on the phone and was 150/95  Pt reports she does feel dizzy for past 3 days   No headache or vision changes   Denies change in medication or other changes  Noticed a red line on the back of her leg recently - described as \"engorged vein\" but denies swelling in legs   Pt currently taking 100 mg losartan daily   No avail appts this week to f/u in office - pls advise? Thanks!

## 2024-12-02 NOTE — TELEPHONE ENCOUNTER
Patient is calling because she is concerned she takes blood pressure medication but her blood pressure this morning is 145/98.

## 2024-12-02 NOTE — TELEPHONE ENCOUNTER
Would recommend to continue taking losartan at current dosage and check BP twice per day (once in morning about 30 min or later after taking losartan and once in evening). Do this for at least 7 days. Write down the values and bring them in for appointment next week and depending on the values we can see if a second blood pressure med needs to be added or not.     If /110 or higher then can go to urgent care or ER.     Praveen Granger MD, 12/02/24, 12:16 PM

## 2024-12-02 NOTE — TELEPHONE ENCOUNTER
Advised pt to check BP twice per day as recommended and bring values to appt next week, go to ER with BP >180/110  Pt verbalized understanding   Appt booked for next Thursday

## 2024-12-12 ENCOUNTER — OFFICE VISIT (OUTPATIENT)
Dept: FAMILY MEDICINE CLINIC | Facility: CLINIC | Age: 54
End: 2024-12-12
Payer: COMMERCIAL

## 2024-12-12 VITALS
HEIGHT: 62.5 IN | WEIGHT: 172 LBS | DIASTOLIC BLOOD PRESSURE: 80 MMHG | HEART RATE: 83 BPM | SYSTOLIC BLOOD PRESSURE: 123 MMHG | RESPIRATION RATE: 16 BRPM | BODY MASS INDEX: 30.86 KG/M2 | OXYGEN SATURATION: 98 %

## 2024-12-12 DIAGNOSIS — I10 PRIMARY HYPERTENSION: Primary | ICD-10-CM

## 2024-12-12 DIAGNOSIS — S29.011D PECTORALIS MUSCLE STRAIN, SUBSEQUENT ENCOUNTER: ICD-10-CM

## 2024-12-12 PROCEDURE — 3079F DIAST BP 80-89 MM HG: CPT | Performed by: STUDENT IN AN ORGANIZED HEALTH CARE EDUCATION/TRAINING PROGRAM

## 2024-12-12 PROCEDURE — 99213 OFFICE O/P EST LOW 20 MIN: CPT | Performed by: STUDENT IN AN ORGANIZED HEALTH CARE EDUCATION/TRAINING PROGRAM

## 2024-12-12 PROCEDURE — 3008F BODY MASS INDEX DOCD: CPT | Performed by: STUDENT IN AN ORGANIZED HEALTH CARE EDUCATION/TRAINING PROGRAM

## 2024-12-12 PROCEDURE — 3074F SYST BP LT 130 MM HG: CPT | Performed by: STUDENT IN AN ORGANIZED HEALTH CARE EDUCATION/TRAINING PROGRAM

## 2024-12-12 NOTE — PROGRESS NOTES
[facility] Family Medicine Office Note    HPI:     Indiana Coello is a 54 year old female presenting for blood pressure follow-up.     BP at home has been 130s-140s/80s to occasionally 90s diastolic. BP in-office 128/90 initially and then 123/80 on re-check. After demonstrating how to properly place blood pressure cuff at home patient notes that she has likely been placing the cuff too tightly.     Has still noted some anterior shoulder/upper right pectoralis muscle strain. Wondering if there are some additional home exercises that can be provided.     HISTORY:  Past Medical History:    Anxiety, generalized    generalized anxiety the majority of her life. Anxiety attacks at work where she feels hot & can get hard to breathe.    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 +H/o infertility - ovulatory dysfunction. HSG normal in EMR but patient states \"tubes were too small\"    Fibroids    9/9/22 Pelvic US - 5.5 cm right fundal fibroid. Left uterine body fibroid 6.8 cm.     Flatulence/gas pain/belching    Frequent urination    Headache disorder    High cholesterol    History of depression    History of pelvic ultrasound    Done for LLQ pain. Two fibroids 4.5 cm & 2.9 cm - intramural/subserosal. Endometrium 8 mm. Ovaries normal. Small free fluid.     Hyperlipidemia    Hypothyroidism    Pap smear for cervical cancer screening    Pap & HPV negative. No abn pap.     Renan    sees derm    Stress    CHRISTOPHER (stress urinary incontinence, female)    Pelvic floor PT attended 10/2022 - 1/2023 for CHRISTOPHER, urinary frequency, pelvic floor dysfunction. CHRISTOPHER resolved. Feels she can empty bladder.    Tinnitus    bilateral, intermittent. Not following with ENT currently.     Wears glasses       Past Surgical History:   Procedure Laterality Date    Biopsy vulva/perineum,one lesn  07/05/2017    At perineal body - Path: spongiotic dermatitis (eczematous process.) Fungal stain neg. Dr. Rachel Gore    Hernia surgery N/A 3/16/2022    REPAIR UMBILICAL HERNIA - NO MESH. Yusef Raza MD. [facility], [facility]    X-ray hysterosalpingogram  12/06/2012    Normal HSG for infertility. Erika. Dr. Mayfield      Family History   Problem Relation Age of Onset    Breast Cancer Mother 50    Thyroid disease Mother     Asthma Mother     Other (anxiety) Mother     Heart Disorder Father 60    Thyroid disease Maternal Grandmother     Stroke Maternal Grandmother     Diabetes Paternal Grandmother     Skin cancer Maternal Uncle         Not in contact currently     Ovarian Cancer Neg     Colon Cancer Neg     Colon Polyps Neg     Osteoporosis Neg     Birth Defects Neg     Genetic Disease Neg       Social History:   Social History     Socioeconomic History    Marital status: Single   Tobacco Use    Smoking status: Never    Smokeless tobacco: Never   Vaping Use    Vaping status: Never Used   Substance and Sexual Activity    Alcohol use: Not Currently     Comment: Drink maybe once a month    Drug use: Never    Sexual activity: Not Currently     Birth control/protection: Abstinence   Other Topics Concern    Caffeine Concern Yes     Comment: too much gives me headaches    Weight Concern Yes     Comment: would love a weight lose plan        Medications (Active prior to today's visit):  Current Outpatient Medications   Medication Sig Dispense Refill    PEG 3350-KCl-Na Bicarb-NaCl 420 g Oral Recon Soln Take as directed by physician 4000 mL 0    NON FORMULARY Per pt, she is taking steroids, does not know the name.      LOVASTATIN 20 MG Oral Tab TAKE 1 TABLET(20 MG) BY MOUTH DAILY 90 tablet 1    levothyroxine 125 MCG Oral Tab Take 1 tablet (125 mcg total) by mouth before breakfast.      LOSARTAN 100 MG Oral Tab TAKE 1 TABLET(100 MG)  BY MOUTH DAILY 90 tablet 1    metroNIDAZOLE 0.75 % External Gel Apply 1 g topically 2 (two) times daily. Apply to face twice daily 45 g 5    COQ10 OR Take 1 tablet by mouth daily.      GARLIC OR Take 1 tablet by mouth daily.      CALCIUM OR Take 1 tablet by mouth daily as needed.         Allergies:  Allergies[1]      ROS:   Review of Systems   Musculoskeletal:         +right upper pectoral pain     Otherwise see HPI    PHYSICAL EXAM:   /80 (BP Location: Left arm, Patient Position: Sitting, Cuff Size: adult)   Pulse 83   Resp 16   Ht 5' 2.5\" (1.588 m)   Wt 172 lb (78 kg)   LMP 08/03/2024 (Exact Date)   SpO2 98%   BMI 30.96 kg/m²  Estimated body mass index is 30.96 kg/m² as calculated from the following:    Height as of this encounter: 5' 2.5\" (1.588 m).    Weight as of this encounter: 172 lb (78 kg).   Vital signs reviewed.Appears stated age, well groomed.    Physical Exam  Constitutional:       General: She is not in acute distress.  Neurological:      Mental Status: She is alert.           ASSESSMENT/PLAN:     54 year old female presenting for f/u of BP and possible pectoralis muscle pain.       1. Primary hypertension  - BP in office unremarkable, likely secondary to patient's BP machine at home  - advised to avoid putting on cuff too tight, provided instructions on how to check BP at home accurately  - if still high can have patient bring in BP cuff machine to compared, otherwise cpm with losartan 100 mg daily     2. Pectoralis muscle strain, subsequent encounter  - provided home physical therapy exercises    Follow-up: in late February/early March for annual     Outcome: Patient verbalizes understanding. Patient is notified to call with any questions, complications, allergies, or worsening or changing symptoms.  Patient is to call with any side effects or complications from the treatments as a result of today.     Total length of visit/charting: approximately 21 min    Praveen Granger MD, 12/12/24,  11:37 AM      Please note that portions of this note may have been completed with a voice recognition program. Efforts were made to edit the dictations but occasionally words are mis-transcribed.       [1] No Known Allergies

## 2024-12-18 DIAGNOSIS — E03.8 OTHER SPECIFIED HYPOTHYROIDISM: ICD-10-CM

## 2024-12-19 RX ORDER — LEVOTHYROXINE SODIUM 100 UG/1
100 TABLET ORAL
Qty: 90 TABLET | Refills: 1 | OUTPATIENT
Start: 2024-12-19

## 2024-12-20 ENCOUNTER — PATIENT MESSAGE (OUTPATIENT)
Dept: FAMILY MEDICINE CLINIC | Facility: CLINIC | Age: 54
End: 2024-12-20

## 2024-12-20 DIAGNOSIS — E03.8 OTHER SPECIFIED HYPOTHYROIDISM: ICD-10-CM

## 2024-12-20 RX ORDER — LEVOTHYROXINE SODIUM 100 UG/1
100 TABLET ORAL
Qty: 30 TABLET | Refills: 1 | Status: SHIPPED | OUTPATIENT
Start: 2024-12-20

## 2025-02-07 PROBLEM — M25.512 ACUTE PAIN OF LEFT SHOULDER: Status: RESOLVED | Noted: 2018-10-01 | Resolved: 2025-02-07

## 2025-02-10 ENCOUNTER — OFFICE VISIT (OUTPATIENT)
Dept: FAMILY MEDICINE CLINIC | Facility: CLINIC | Age: 55
End: 2025-02-10
Payer: COMMERCIAL

## 2025-02-10 VITALS
OXYGEN SATURATION: 96 % | HEART RATE: 74 BPM | BODY MASS INDEX: 30.5 KG/M2 | WEIGHT: 170 LBS | HEIGHT: 62.5 IN | SYSTOLIC BLOOD PRESSURE: 126 MMHG | DIASTOLIC BLOOD PRESSURE: 88 MMHG | RESPIRATION RATE: 16 BRPM

## 2025-02-10 DIAGNOSIS — Z23 NEED FOR VACCINATION: ICD-10-CM

## 2025-02-10 DIAGNOSIS — Z00.00 LABORATORY EXAM ORDERED AS PART OF ROUTINE GENERAL MEDICAL EXAMINATION: ICD-10-CM

## 2025-02-10 DIAGNOSIS — Z00.00 ANNUAL PHYSICAL EXAM: ICD-10-CM

## 2025-02-10 DIAGNOSIS — E03.8 OTHER SPECIFIED HYPOTHYROIDISM: Primary | ICD-10-CM

## 2025-02-10 LAB
ALBUMIN SERPL-MCNC: 4.6 G/DL (ref 3.2–4.8)
ALBUMIN/GLOB SERPL: 1.5 {RATIO} (ref 1–2)
ALP LIVER SERPL-CCNC: 51 U/L
ALT SERPL-CCNC: 11 U/L
ANION GAP SERPL CALC-SCNC: 8 MMOL/L (ref 0–18)
AST SERPL-CCNC: 26 U/L (ref ?–34)
BASOPHILS # BLD AUTO: 0.02 X10(3) UL (ref 0–0.2)
BASOPHILS NFR BLD AUTO: 0.4 %
BILIRUB SERPL-MCNC: 0.8 MG/DL (ref 0.3–1.2)
BUN BLD-MCNC: 11 MG/DL (ref 9–23)
CALCIUM BLD-MCNC: 9.7 MG/DL (ref 8.7–10.6)
CHLORIDE SERPL-SCNC: 103 MMOL/L (ref 98–112)
CHOLEST SERPL-MCNC: 201 MG/DL (ref ?–200)
CO2 SERPL-SCNC: 28 MMOL/L (ref 21–32)
CREAT BLD-MCNC: 0.85 MG/DL
EGFRCR SERPLBLD CKD-EPI 2021: 82 ML/MIN/1.73M2 (ref 60–?)
EOSINOPHIL # BLD AUTO: 0.1 X10(3) UL (ref 0–0.7)
EOSINOPHIL NFR BLD AUTO: 1.8 %
ERYTHROCYTE [DISTWIDTH] IN BLOOD BY AUTOMATED COUNT: 12.4 %
EST. AVERAGE GLUCOSE BLD GHB EST-MCNC: 114 MG/DL (ref 68–126)
FASTING PATIENT LIPID ANSWER: NO
FASTING STATUS PATIENT QL REPORTED: NO
GLOBULIN PLAS-MCNC: 3 G/DL (ref 2–3.5)
GLUCOSE BLD-MCNC: 103 MG/DL (ref 70–99)
HBA1C MFR BLD: 5.6 % (ref ?–5.7)
HCT VFR BLD AUTO: 40.2 %
HDLC SERPL-MCNC: 56 MG/DL (ref 40–59)
HGB BLD-MCNC: 14.2 G/DL
IMM GRANULOCYTES # BLD AUTO: 0.01 X10(3) UL (ref 0–1)
IMM GRANULOCYTES NFR BLD: 0.2 %
LDLC SERPL CALC-MCNC: 130 MG/DL (ref ?–100)
LYMPHOCYTES # BLD AUTO: 2.1 X10(3) UL (ref 1–4)
LYMPHOCYTES NFR BLD AUTO: 37 %
MCH RBC QN AUTO: 31.6 PG (ref 26–34)
MCHC RBC AUTO-ENTMCNC: 35.3 G/DL (ref 31–37)
MCV RBC AUTO: 89.5 FL
MONOCYTES # BLD AUTO: 0.59 X10(3) UL (ref 0.1–1)
MONOCYTES NFR BLD AUTO: 10.4 %
NEUTROPHILS # BLD AUTO: 2.86 X10 (3) UL (ref 1.5–7.7)
NEUTROPHILS # BLD AUTO: 2.86 X10(3) UL (ref 1.5–7.7)
NEUTROPHILS NFR BLD AUTO: 50.2 %
NONHDLC SERPL-MCNC: 145 MG/DL (ref ?–130)
OSMOLALITY SERPL CALC.SUM OF ELEC: 288 MOSM/KG (ref 275–295)
PLATELET # BLD AUTO: 261 10(3)UL (ref 150–450)
POTASSIUM SERPL-SCNC: 4.3 MMOL/L (ref 3.5–5.1)
PROT SERPL-MCNC: 7.6 G/DL (ref 5.7–8.2)
RBC # BLD AUTO: 4.49 X10(6)UL
SODIUM SERPL-SCNC: 139 MMOL/L (ref 136–145)
T4 FREE SERPL-MCNC: 1.4 NG/DL (ref 0.8–1.7)
TRIGL SERPL-MCNC: 81 MG/DL (ref 30–149)
TSI SER-ACNC: 9.49 UIU/ML (ref 0.55–4.78)
VLDLC SERPL CALC-MCNC: 15 MG/DL (ref 0–30)
WBC # BLD AUTO: 5.7 X10(3) UL (ref 4–11)

## 2025-02-10 PROCEDURE — 84443 ASSAY THYROID STIM HORMONE: CPT | Performed by: STUDENT IN AN ORGANIZED HEALTH CARE EDUCATION/TRAINING PROGRAM

## 2025-02-10 PROCEDURE — 83036 HEMOGLOBIN GLYCOSYLATED A1C: CPT | Performed by: STUDENT IN AN ORGANIZED HEALTH CARE EDUCATION/TRAINING PROGRAM

## 2025-02-10 PROCEDURE — 80061 LIPID PANEL: CPT | Performed by: STUDENT IN AN ORGANIZED HEALTH CARE EDUCATION/TRAINING PROGRAM

## 2025-02-10 PROCEDURE — 80053 COMPREHEN METABOLIC PANEL: CPT | Performed by: STUDENT IN AN ORGANIZED HEALTH CARE EDUCATION/TRAINING PROGRAM

## 2025-02-10 PROCEDURE — 84439 ASSAY OF FREE THYROXINE: CPT | Performed by: STUDENT IN AN ORGANIZED HEALTH CARE EDUCATION/TRAINING PROGRAM

## 2025-02-10 PROCEDURE — 85025 COMPLETE CBC W/AUTO DIFF WBC: CPT | Performed by: STUDENT IN AN ORGANIZED HEALTH CARE EDUCATION/TRAINING PROGRAM

## 2025-02-10 RX ORDER — LEVOTHYROXINE SODIUM 112 UG/1
112 TABLET ORAL
Qty: 30 TABLET | Refills: 1 | Status: SHIPPED | OUTPATIENT
Start: 2025-02-10

## 2025-02-10 NOTE — PROGRESS NOTES
[facility] Family Medicine Office Note    HPI:     [place] Coello is a 55 year old female presenting for annual exam.     Diet: no major changes   Exercise: no major changes     Aspirin use? (age 50-59 with ASCVD risk 10% or greater): not indicated  Breast cancer (biennial screening mammography for women aged 40 to 74 years): not indicated  Cervical cancer screen? (q3 yrs age 21-29, q3 or q5 w/HPV cotesting age 30-65): sees gyne   Colonoscopy screen? (age 45-75 or earlier based on risk): not indicated  Depression screen? (PHQ-2 or PHQ-9): PHQ-2 Score of 0  Diabetes screen? (age 35 to 70 who are overweight or obese): A1c ordered  Fall Prevention? (age 65 and older): not indicated  Lipid panel? (age 20-45 with increased risk of CHD or older than 45): ordered  Lung cancer screen if 50-80 w/ 20 pack year smoking history and currently smoking or quit in last 15 yrs? not indicated  Osteoporosis screen? (DEXA in postmenopausal 65 or older at increased risk): not indicated    HISTORY:  Past Medical History:    Anxiety, generalized    generalized anxiety the majority of her life. Anxiety attacks at work where she feels hot & can get hard to breathe.    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 +H/o infertility - ovulatory dysfunction. HSG normal in EMR but patient states \"tubes were too small\"    Fibroids    9/9/22 Pelvic US - 5.5 cm right fundal fibroid. Left uterine body fibroid 6.8 cm.     Flatulence/gas pain/belching    Frequent urination    Headache disorder    High cholesterol    History of depression    History of pelvic ultrasound    Done for LLQ pain. Two fibroids 4.5 cm & 2.9 cm - intramural/subserosal. Endometrium 8 mm. Ovaries normal. Small free fluid.      Hyperlipidemia    Hypothyroidism    Pap smear for cervical cancer screening    Pap & HPV negative. No abn pap.     Rosaneria    sees derm    Stress    CHRISTOPHER (stress urinary incontinence, female)    Pelvic floor PT attended 10/2022 - 1/2023 for CHRISTOPHER, urinary frequency, pelvic floor dysfunction. CHRISTOPHER resolved. Feels she can empty bladder.    Tinnitus    bilateral, intermittent. Not following with ENT currently.     Wears glasses      Past Surgical History:   Procedure Laterality Date    Biopsy vulva/perineum,one lesn  07/05/2017    At perineal body - Path: spongiotic dermatitis (eczematous process.) Fungal stain neg. Dr. Rachel Gore    Hernia surgery N/A 3/16/2022    REPAIR UMBILICAL HERNIA - NO MESH. Yusef Raza [place]. [facility] [place]    X-ray hysterosalpingogram  12/06/2012    Normal HSG for infertility. Erika. Dr. Mayfield      Family History   Problem Relation Age of Onset    Breast Cancer Mother 50    Thyroid disease Mother     Asthma Mother     Other (anxiety) Mother     Heart Disorder Father 60    Thyroid disease Maternal Grandmother     Stroke Maternal Grandmother     Diabetes Paternal Grandmother     Skin cancer Maternal Uncle         Not in contact currently     Ovarian Cancer Neg     Colon Cancer Neg     Colon Polyps Neg     Osteoporosis Neg     Birth Defects Neg     Genetic Disease Neg       Social History:   Social History     Socioeconomic History    Marital status: Single   Tobacco Use    Smoking status: Never    Smokeless tobacco: Never   Vaping Use    Vaping status: Never Used   Substance and Sexual Activity    Alcohol use: Not Currently     Comment: Drink maybe once a month    Drug use: Never    Sexual activity: Not Currently     Birth control/protection: Abstinence   Other Topics Concern    Caffeine Concern Yes     Comment: too much gives me headaches    Weight Concern Yes     Comment: would love a weight lose plan     Social Drivers of Health     Food Insecurity: No Food Insecurity  (2/10/2025)    NCSS - Food Insecurity     Worried About Running Out of Food in the Last Year: No     Ran Out of Food in the Last Year: No   Transportation Needs: No Transportation Needs (2/10/2025)    NCSS - Transportation     Lack of Transportation: No   Housing Stability: Not At Risk (2/10/2025)    NCSS - Housing/Utilities     Has Housing: Yes     Worried About Losing Housing: No     Unable to Get Utilities: No        Medications (Active prior to today's visit):  Current Outpatient Medications   Medication Sig Dispense Refill    levothyroxine 100 MCG Oral Tab Take 1 tablet (100 mcg total) by mouth before breakfast. 30 tablet 1    NON FORMULARY Per pt, she is taking steroids, does not know the name.      LOVASTATIN 20 MG Oral Tab TAKE 1 TABLET(20 MG) BY MOUTH DAILY 90 tablet 1    levothyroxine 125 MCG Oral Tab Take 1 tablet (125 mcg total) by mouth before breakfast.      LOSARTAN 100 MG Oral Tab TAKE 1 TABLET(100 MG) BY MOUTH DAILY 90 tablet 1    metroNIDAZOLE 0.75 % External Gel Apply 1 g topically 2 (two) times daily. Apply to face twice daily 45 g 5    COQ10 OR Take 1 tablet by mouth daily.      GARLIC OR Take 1 tablet by mouth daily.      CALCIUM OR Take 1 tablet by mouth daily as needed.         Allergies:  Allergies[1]      ROS:   Review of Systems   Constitutional:  Negative for chills and fever.     Otherwise see HPI    PHYSICAL EXAM:   /88 (BP Location: Left arm, Patient Position: Sitting, Cuff Size: adult)   Pulse 74   Resp 16   Ht 5' 2.5\" (1.588 m)   Wt 170 lb (77.1 kg)   LMP 02/06/2025 (Approximate)   SpO2 96%   BMI 30.60 kg/m²  Estimated body mass index is 30.6 kg/m² as calculated from the following:    Height as of this encounter: 5' 2.5\" (1.588 m).    Weight as of this encounter: 170 lb (77.1 kg).   Vital signs reviewed.Appears stated age, well groomed.    Physical Exam  Constitutional:       General: She is not in acute distress.  HENT:      Nose: Nose normal.      Mouth/Throat:       Mouth: Mucous membranes are moist.      Pharynx: Oropharynx is clear.   Eyes:      Conjunctiva/sclera: Conjunctivae normal.      Pupils: Pupils are equal, round, and reactive to light.   Cardiovascular:      Rate and Rhythm: Normal rate and regular rhythm.      Heart sounds: Normal heart sounds.   Pulmonary:      Effort: Pulmonary effort is normal.      Breath sounds: Normal breath sounds.   Abdominal:      General: Bowel sounds are normal.      Palpations: Abdomen is soft.      Tenderness: There is no abdominal tenderness. There is no guarding or rebound.   Lymphadenopathy:      Cervical: No cervical adenopathy.   Neurological:      Mental Status: She is alert.           ASSESSMENT/PLAN:     55 year old female presenting for annual exam.       1. Annual physical exam  - encourage well-balanced diet with fruits/vegetables, limited fried/fatty foods, and limited take-out   - encourage at least 150 minutes of moderate intensity aerobic activity weekly     2. Laboratory exam ordered as part of routine general medical examination  - CBC With Differential With Platelet  - Comp Metabolic Panel (14)  - Hemoglobin A1C  - TSH W Reflex To Free T4 (will refill thyroid medication based on results of lab)  - Lipid Panel    3. Need for vaccination  - flu vaccine administered     Follow-up: in 6 months for med check     Outcome: Patient verbalizes understanding. Patient is notified to call with any questions, complications, allergies, or worsening or changing symptoms.  Patient is to call with any side effects or complications from the treatments as a result of today.     Total length of visit/charting: approximately 10 min    Praveen Granger MD, 02/10/25, 8:37 AM      Please note that portions of this note may have been completed with a voice recognition program. Efforts were made to edit the dictations but occasionally words are mis-transcribed.       [1] No Known Allergies

## 2025-02-23 DIAGNOSIS — I10 PRIMARY HYPERTENSION: ICD-10-CM

## 2025-02-24 RX ORDER — LOSARTAN POTASSIUM 100 MG/1
100 TABLET ORAL DAILY
Qty: 90 TABLET | Refills: 1 | Status: SHIPPED | OUTPATIENT
Start: 2025-02-24

## 2025-02-24 NOTE — TELEPHONE ENCOUNTER
Last office visit: 02/10/2025   Protocol: pass    Requested medication(s) are due for refill today: Yes    Requested medication(s) are on the active medication list same strength, form, dose/ sig: Yes    Requested medication(s) are managed by provider: Yes    Patient has already received a courtsey refill: No    NOV: 8/11/2025  Asked to Return: 8/10/25

## 2025-02-25 DIAGNOSIS — E78.2 MIXED HYPERLIPIDEMIA: ICD-10-CM

## 2025-02-25 RX ORDER — LOVASTATIN 20 MG/1
20 TABLET ORAL DAILY
Qty: 90 TABLET | Refills: 1 | OUTPATIENT
Start: 2025-02-25

## 2025-02-25 RX ORDER — LOVASTATIN 20 MG/1
20 TABLET ORAL DAILY
Qty: 90 TABLET | Refills: 1 | Status: SHIPPED | OUTPATIENT
Start: 2025-02-25

## 2025-02-25 NOTE — TELEPHONE ENCOUNTER
Last office visit: 2/10/25   Protocol: pass  Requested medication(s) are due for refill today: yes  Requested medication(s) are on the active medication list same strength, form, dose/ sig: yes  Requested medication(s) are managed by provider: yes  Patient has already received a courtsey refill: no    NOV: 8/11/25    Asked to Return: 8/10/25

## 2025-02-28 ENCOUNTER — PATIENT MESSAGE (OUTPATIENT)
Dept: FAMILY MEDICINE CLINIC | Facility: CLINIC | Age: 55
End: 2025-02-28

## 2025-03-07 DIAGNOSIS — E03.8 OTHER SPECIFIED HYPOTHYROIDISM: Primary | ICD-10-CM

## 2025-03-10 RX ORDER — LEVOTHYROXINE SODIUM 112 UG/1
112 TABLET ORAL
Qty: 30 TABLET | Refills: 0 | Status: SHIPPED | OUTPATIENT
Start: 2025-03-10

## 2025-03-10 NOTE — TELEPHONE ENCOUNTER
Last office visit: 02/10/25   Protocol: pass    Requested medication(s) are due for refill today: Yes    Requested medication(s) are on the active medication list same strength, form, dose/ sig: Yes    Requested medication(s) are managed by provider: Yes    Patient has already received a courtsey refill: No    NOV: 8/11/2025  Asked to Return: 8/10/2025

## 2025-03-27 ENCOUNTER — LAB ENCOUNTER (OUTPATIENT)
Dept: LAB | Age: 55
End: 2025-03-27
Attending: STUDENT IN AN ORGANIZED HEALTH CARE EDUCATION/TRAINING PROGRAM
Payer: COMMERCIAL

## 2025-03-27 DIAGNOSIS — E03.8 OTHER SPECIFIED HYPOTHYROIDISM: ICD-10-CM

## 2025-03-27 LAB
T4 FREE SERPL-MCNC: 1.6 NG/DL (ref 0.8–1.7)
TSI SER-ACNC: 1.5 UIU/ML (ref 0.55–4.78)

## 2025-03-27 PROCEDURE — 84443 ASSAY THYROID STIM HORMONE: CPT

## 2025-03-27 PROCEDURE — 36415 COLL VENOUS BLD VENIPUNCTURE: CPT

## 2025-03-27 PROCEDURE — 84439 ASSAY OF FREE THYROXINE: CPT

## 2025-03-28 ENCOUNTER — PATIENT MESSAGE (OUTPATIENT)
Dept: FAMILY MEDICINE CLINIC | Facility: CLINIC | Age: 55
End: 2025-03-28

## 2025-03-28 DIAGNOSIS — M72.2 PLANTAR FASCIITIS: ICD-10-CM

## 2025-03-28 DIAGNOSIS — M21.619 BUNION: Primary | ICD-10-CM

## 2025-03-28 DIAGNOSIS — E03.8 OTHER SPECIFIED HYPOTHYROIDISM: Primary | ICD-10-CM

## 2025-03-28 RX ORDER — LEVOTHYROXINE SODIUM 112 UG/1
112 TABLET ORAL
Qty: 90 TABLET | Refills: 2 | Status: SHIPPED | OUTPATIENT
Start: 2025-03-28

## 2025-04-18 ENCOUNTER — TELEPHONE (OUTPATIENT)
Facility: CLINIC | Age: 55
End: 2025-04-18

## 2025-04-18 NOTE — TELEPHONE ENCOUNTER
Patient called to add to the appointment that she would like orthotics for her feet. Patient also confirmed we received referral.   Future Appointments   Date Time Provider Department Center   7/9/2025 10:30 AM Leandro Byrne DPM [id number]   8/11/2025 10:00 AM Praveen Granger MD [facility] [place]     Please advise if imaging is needed

## 2025-05-02 ENCOUNTER — PATIENT MESSAGE (OUTPATIENT)
Dept: FAMILY MEDICINE CLINIC | Facility: CLINIC | Age: 55
End: 2025-05-02

## 2025-05-02 NOTE — TELEPHONE ENCOUNTER
Per pt ongoing R shoulder pain from last year   Pt still has an active PT referral from 8/2024  for R shoulder pain   Pt will try PT   Fyi     8/28/2024  8:59 PM CDT       Arthritic changes and possible early/minimal calcific tendinopathy. Recommend physical therapy.      Praveen Granger MD, 08/28/24, 8:56 PM

## 2025-05-11 ENCOUNTER — TELEPHONE (OUTPATIENT)
Dept: PHYSICAL THERAPY | Facility: HOSPITAL | Age: 55
End: 2025-05-11

## 2025-05-12 ENCOUNTER — OFFICE VISIT (OUTPATIENT)
Dept: PHYSICAL THERAPY | Age: 55
End: 2025-05-12
Attending: STUDENT IN AN ORGANIZED HEALTH CARE EDUCATION/TRAINING PROGRAM
Payer: COMMERCIAL

## 2025-05-12 DIAGNOSIS — M25.511 ACUTE PAIN OF RIGHT SHOULDER: Primary | ICD-10-CM

## 2025-05-12 PROCEDURE — 97110 THERAPEUTIC EXERCISES: CPT | Performed by: PHYSICAL THERAPIST

## 2025-05-12 PROCEDURE — 97161 PT EVAL LOW COMPLEX 20 MIN: CPT | Performed by: PHYSICAL THERAPIST

## 2025-05-12 PROCEDURE — 97140 MANUAL THERAPY 1/> REGIONS: CPT | Performed by: PHYSICAL THERAPIST

## 2025-05-12 NOTE — PROGRESS NOTES
UPPER EXTREMITY EVALUATION:     Diagnosis:   Acute pain of right shoulder (M25.511) Patient:  Indiana Coello (55 year old, female)        Referring Provider: Praveen Granger  Today's Date   5/12/2025    Precautions:      Date of Evaluation: 05/12/25  Next MD visit: none  Date of Surgery: -     PATIENT SUMMARY   Summary of chief complaints: R neck and shoudler pain with reaching, turning, lifting  History of current condition: onset R shoulder pain 6 months ago, recently started spreading to neck.   Pain level: current 3 /10, at best 0 /10, at worst 10 /10  Description of symptoms: sharp pain with reaching,   Occupation: [employer], lifts ~ 40#   Leisure activities/Hobbies: swim, bowling, reading, computer games, 2 cats.   Prior level of function: fully functional  Current limitations: lifting above shoulder, reaching, turning, head, sleeping,  Pt goals: getting back to normal function  Hand Dominance:     Past medical history was reviewed with [place].  Significant findings include: -  Imaging/Tests: -   Indiana  has a past medical history of Anxiety, generalized (06/25/2024), Belching, Bilateral plantar fasciitis, Bloating, Body piercing, Bunion, Diverticulosis of colon (06/2017), Dizziness, Fatigue, Female infertility due to ovulatory disorder, Fibroids, Flatulence/gas pain/belching, Frequent urination, Headache disorder, High cholesterol, History of depression, History of pelvic ultrasound (05/2017), Hyperlipidemia (2018), Hypothyroidism (1990), Pap smear for cervical cancer screening (04/07/2022), Rosacea, Stress, CHRISTOPHER (stress urinary incontinence, female) (2022), Tinnitus (2015), and Wears glasses.  She  has a past surgical history that includes hernia surgery (N/A, 3/16/2022); x-ray hysterosalpingogram (12/06/2012); and biopsy vulva/perineum,one siddhartha (07/05/2017).    ASSESSMENT  Indiana presents to physical therapy evaluation with primary c/o R neck and shoudler pain with reaching, turning, lifting. The results  of the objective tests and measures show Limited cervical and R shoulder ROM, possible R AC joint involvement and lower cervical derangement. Functional deficits include but are not limited to lifting above shoulder, reaching, turning, head, sleeping,. Signs and symptoms are consistent with diagnosis of Acute pain of right shoulder (M25.511). Pt and PT discussed evaluation findings, pathology, POC and HEP.  Pt voiced understanding and performs HEP correctly without reported pain. Skilled Physical Therapy is medically necessary to address the above impairments and reach functional goals.  OBJECTIVE:    Musculoskeletal  Observation/Posture: forward head posture  Palpation: tender R C5-7, UT   Accessory motion: -    Special Tests: Neg empty can, Speed's and Neer test.     ROM and Strength (* denotes performed with pain)     Cervical ROM MMT (-/5)     Flex 40 4     Ext 30 4    R L R L     Side bend 25 25         Rotation 55* 60*       ,   Shoulder   ROM MMT (-/5)    R L R L     Flex 160* 170 4 5     Ext 40 40 5 5     Abd (C5) 150* 160 4 5     IR T10 T9 5 5     ER 60 65 4 5     Low Trap n/a         Mid Trap n/a         SA n/a         ,   Elbow   ROM MMT (-/5)    R L R L     Flex (C6)             Ext (C7)     5 5     Pronation     5 5     Supination               Flexibility:  UE Flexibility R L     Upper Trap min restricted min restricted     Levator Scap min restricted min restricted     Pec Major mod restricted mod restricted     Scalenes min restricted min restricted     Latissimus         Bicep WNL WNL     LE Flexibility Other R L              Neurological:  Sensation: occ N/T in R hand when on the computer - possibly due to resting wrist on desk, intact to light touch      Today's Treatment and Response:   Pt education was provided on exam findings, treatment diagnosis, treatment plan, expectations, and prognosis.  Today's Treatment       5/12/2025   UE Treatment   Therapeutic Exercise Prone trunk ext  Scap  ret  Cervical ret  Cervical RR  Self shoulder ext  Prone trunk ext   Neuro Re-Education Use of lumbar roll, postural ed x 35 min   Manual Therapy Man R shoulder ext mobs gr 3 x 5 min, dec, B  Man R AC jt inf glide x 5 min, dec, B   Therapeutic Exercise Minutes 10   Neuro Re-Educ Minutes 5   Manual Therapy Minutes 10   Evaluation Minutes 25   Total Time Of Timed Procedures 25   Total Time Of Service-Based Procedures 25   Total Treatment Time 50   HEP Prone trunk ext  Scap ret  Cervical ret  Cervical RR  Self shoulder ext  Prone trunk ext  Use of lumbar roll when seated        Patient was instructed in and issued a HEP for: Prone trunk ext  Scap ret  Cervical ret  Cervical RR  Self shoulder ext  Prone trunk ext  Use of lumbar roll when seated    Charges:  PT EVAL: Low Complexity, TE1, MT1  In agreement with evaluation findings and clinical rationale, this evaluation involved LOW COMPLEXITY decision making due to no personal factors/comorbidities, 1-2 body structures involved/activity limitations, and stable symptoms as documented in the evaluation.                                                          PLAN OF CARE:    Goals: (to be met in 10 visits)     Long term goals to be reached in 10 visits.  Pt. will report decreased pain from 10/10 to 2/10 at worst.  Pt. will report no pain during pushing and pulling.  Increase active range of motion of cervical rotation to 70 degrees bilaterally to allow safe driving.    Increase strength of R shoulder to 5/5 to allow pt to perform work functions without pain.  Pt. will be able to tolerate lifting 30# for 10 repetitions minutes without increased symptoms.  Pt. will be able to perform ADLs with no pain.  Pt. will be independent with home exercise program and self management.       Frequency / Duration: Patient will be seen 1-2x/week or a total of 5  visits over a 90 day period. Treatment will include: Manual Therapy; Neuromuscular Re-education; Therapeutic Exercise; Home  Exercise Program instruction    Education or treatment limitation: None   Rehab Potential: good     Neck Disability Index Score  Score: (Patient-Rptd) 14 % (5/10/2025 10:58 AM)    Patient/Family/Caregiver was advised of these findings, precautions, and treatment options and has agreed to actively participate in planning and for this course of care.    Thank you for your referral. Please co-sign or sign and return this letter via fax as soon as possible to [phone]. If you have any questions, please contact me at Dept: [phone]    Sincerely,  Electronically signed by therapist: Robert Spangler, PT, DPT, OCS, Cert MDT   Physician's certification required: Yes  I certify the need for these services furnished under this plan of treatment and while under my care.    X___________________________________________________ Date____________________    Certification From: 5/12/2025  To:8/10/2025

## 2025-05-16 ENCOUNTER — OFFICE VISIT (OUTPATIENT)
Dept: PHYSICAL THERAPY | Age: 55
End: 2025-05-16
Attending: STUDENT IN AN ORGANIZED HEALTH CARE EDUCATION/TRAINING PROGRAM
Payer: COMMERCIAL

## 2025-05-16 PROCEDURE — 97110 THERAPEUTIC EXERCISES: CPT | Performed by: PHYSICAL THERAPIST

## 2025-05-16 PROCEDURE — 97140 MANUAL THERAPY 1/> REGIONS: CPT | Performed by: PHYSICAL THERAPIST

## 2025-05-16 NOTE — PROGRESS NOTES
Patient: [place] Coello (55 year old, female) Referring Provider:  Insurance:   Diagnosis: Acute pain of right shoulder (M25.511) [facility]  [facility]   Date of Surgery: - Next MD visit:  N/A   Precautions:    none Referral Information:    Date of Evaluation: Req: 8, Auth: 8, Exp: 8/31/2025 05/12/25 POC Auth Visits:  10       Today's Date   5/16/2025    Subjective  doing HEP, feels shoulder ext mobs are most helpful.       Pain: 1/10     Objective  reviewed HEP. assessed photo of pt sitting at computer desk at home and suggest moving monitor to be straight ahead instead of off to the side. Pt has ERP with cervical RR and R shoulder flex, howerver R shoulder flex ROM improved to 160 degrees before onset of pain            Assessment  responding to treatment, R lower cervical derangement with mod irritability.    Goals (to be met in 10 visits)   Long term goals to be reached in 10 visits.  Pt. will report decreased pain from 10/10 to 2/10 at worst.  Pt. will report no pain during pushing and pulling.  Increase active range of motion of cervical rotation to 70 degrees bilaterally to allow safe driving.    Increase strength of R shoulder to 5/5 to allow pt to perform work functions without pain.  Pt. will be able to tolerate lifting 30# for 10 repetitions minutes without increased symptoms.  Pt. will be able to perform ADLs with no pain.  Pt. will be independent with home exercise program and self management.           Plan  cont current HEP, add cervical rot with towel OP, move monitor to straight ahead, assess response.    Treatment Last 4 Visits  Treatment Day: 2       5/12/2025 5/16/2025   UE Treatment   Therapeutic Exercise Prone trunk ext  Scap ret  Cervical ret  Cervical RR  Self shoulder ext  Prone trunk ext UBE 6 min, L4  4# shoulder flex and abd to 90, 2x10 ea  Prone trunk ext 2x10  Scap ret  Cervical ret  Cervical RR w towel OP x 2 min  Self shoulder ext       Neuro Re-Education Use of lumbar  roll, postural ed x 35 min Assessed ergonomics at computer at home, 8 min   Manual Therapy Man R shoulder ext mobs gr 3 x 5 min, dec, B  Man R AC jt inf glide x 5 min, dec, B Prone thor ext mobs gr 3 x 3 min, tight  Supine man C tx x 3 min  Supine man RR/LR mobs x 2 min ea  Supine UT stretch x 2 min ea   Therapeutic Exercise Minutes 10 28   Neuro Re-Educ Minutes 5 8   Manual Therapy Minutes 10 10   Evaluation Minutes 25    Total Time Of Timed Procedures 25 46   Total Time Of Service-Based Procedures 25 0   Total Treatment Time 50 46   HEP Prone trunk ext  Scap ret  Cervical ret  Cervical RR  Self shoulder ext  Prone trunk ext  Use of lumbar roll when seated         HEP  Prone trunk ext  Scap ret  Cervical ret  Cervical RR  Self shoulder ext  Prone trunk ext  Use of lumbar roll when seated    Charges  TE2, MT1, NMR1

## 2025-05-20 ENCOUNTER — TELEPHONE (OUTPATIENT)
Dept: PHYSICAL THERAPY | Facility: HOSPITAL | Age: 55
End: 2025-05-20

## 2025-06-03 ENCOUNTER — OFFICE VISIT (OUTPATIENT)
Dept: PHYSICAL THERAPY | Age: 55
End: 2025-06-03
Attending: STUDENT IN AN ORGANIZED HEALTH CARE EDUCATION/TRAINING PROGRAM
Payer: COMMERCIAL

## 2025-06-03 PROCEDURE — 97110 THERAPEUTIC EXERCISES: CPT

## 2025-06-03 PROCEDURE — 97140 MANUAL THERAPY 1/> REGIONS: CPT

## 2025-06-03 NOTE — PROGRESS NOTES
Patient: [place] Coello (55 year old, female) Referring Provider:  Insurance:   Diagnosis: Acute pain of right shoulder (M25.511) [facility]  [facility]   Date of Surgery: -n/a Next MD visit:  N/A   Precautions:   n/a none Referral Information:    Date of Evaluation: Req: 8, Auth: 8, Exp: 8/31/2025 05/12/25 POC Auth Visits:  10       Today's Date   6/3/2025    Subjective  Pt. reports she is better and the exercises help.       Pain: 5/10     Objective  R SC joint superficial to the L            Assessment  Pt. still had 5/10 cervical pain post session. Progressed R RTC stability and strengthening to enhance body mechanics when lifting and to support AC/SC joint. Introduced new stretches for thoracic spine and pt. handled all clinical exercise progressions well. Will continue to work on restoring equal cervical rotation and improving R RTC strength.    Goals (to be met in 10 visits)   Pt. will report decreased pain from 10/10 to 2/10 at worst.  ·Pt. will report no pain during pushing and pulling.  ·Increase active range of motion of cervical rotation to 70 degrees bilaterally to allow safe driving.    ·Increase strength of R shoulder to 5/5 to allow pt to perform work functions without pain.  ·Pt. will be able to tolerate lifting 30# for 10 repetitions minutes without increased symptoms.  ·Pt. will be able to perform ADLs with no pain.  ·Pt. will be independent with home exercise program and self management.        Plan  cont current HEP, add cervical rot with towel OP, move monitor to straight ahead, assess response.    Treatment Last 4 Visits  Treatment Day: 3       5/12/2025 5/16/2025 6/3/2025   UE Treatment   Therapeutic Exercise Prone trunk ext  Scap ret  Cervical ret  Cervical RR  Self shoulder ext  Prone trunk ext UBE 6 min, L4  4# shoulder flex and abd to 90, 2x10 ea  Prone trunk ext 2x10  Scap ret  Cervical ret  Cervical RR w towel OP x 2 min  Self shoulder ext     2x10 (2,3 lbs DB) supine shoulder  flexion arc to 90 deg    2x10 s/l shoulder ER (no weight, 2 lbs DB)  2x10 s/l shoulder abduction (no weight, 2 lbs)    2x10 seated cervical retraction w/ lumbar support  2x10 SNAGs/cervical rot w/ towel    2x10 seated thoracic ext w/ half foam  2x10 scap retraction with ER (red)   Neuro Re-Education Use of lumbar roll, postural ed x 35 min Assessed ergonomics at computer at home, 4 min    Manual Therapy Man R shoulder ext mobs gr 3 x 5 min, dec, B  Man R AC jt inf glide x 5 min, dec, B Prone thor ext mobs gr 3 x 3 min, tight  Supine man C tx x 3 min  Supine man RR/LR mobs x 2 min ea  Supine UT stretch x 2 min ea Bilat scalenes and cervical paraspinals, UT STM 18 min  5 min R shoulder PROM with sternoclavicular jt pinning   Therapeutic Exercise Minutes 10 28 23   Neuro Re-Educ Minutes 5 4    Manual Therapy Minutes 10 10 23   Evaluation Minutes 25     Total Time Of Timed Procedures 25 42 46   Total Time Of Service-Based Procedures 25 0 0   Total Treatment Time 50 42 46   HEP Prone trunk ext  Scap ret  Cervical ret  Cervical RR  Self shoulder ext  Prone trunk ext  Use of lumbar roll when seated          HEP  Prone trunk ext  Scap ret  Cervical ret  Cervical RR  Self shoulder ext  Prone trunk ext  Use of lumbar roll when seated    Charges  2 therex 2 manual

## 2025-06-06 ENCOUNTER — OFFICE VISIT (OUTPATIENT)
Dept: PHYSICAL THERAPY | Age: 55
End: 2025-06-06
Attending: STUDENT IN AN ORGANIZED HEALTH CARE EDUCATION/TRAINING PROGRAM
Payer: COMMERCIAL

## 2025-06-06 PROCEDURE — 97110 THERAPEUTIC EXERCISES: CPT

## 2025-06-06 PROCEDURE — 97140 MANUAL THERAPY 1/> REGIONS: CPT

## 2025-06-06 NOTE — PROGRESS NOTES
Patient: Indiana Coello (55 year old, female) Referring Provider:  Insurance:   Diagnosis: Acute pain of right shoulder (M25.511) [facility]  [facility]   Date of Surgery: - Next MD visit:  N/A   Precautions:  None none Referral Information:    Date of Evaluation: Req: 8, Auth: 8, Exp: 8/31/2025 05/12/25 POC Auth Visits:  10       Today's Date   6/6/2025    Subjective  Pt. is in pain today.       Pain: 7/10     Objective  L>R first rib elevation            Assessment  Spent more time with manual interventions today to work on mobility deficits as well to address increased levels of pain today. Explained manual techniques before performing on patient to ensure comfort. Continued clinical exercises in effort to work on thoracic and cervical restrictions contributing to R shoulder symptoms.    Goals (to be met in 10 visits)   Pt. will report decreased pain from 10/10 to 2/10 at worst.  ·Pt. will report no pain during pushing and pulling.  ·Increase active range of motion of cervical rotation to 70 degrees bilaterally to allow safe driving.    ·Increase strength of R shoulder to 5/5 to allow pt to perform work functions without pain.  ·Pt. will be able to tolerate lifting 30# for 10 repetitions minutes without increased symptoms.  ·Pt. will be able to perform ADLs with no pain.  ·Pt. will be independent with home exercise program and self management.            Plan  cont current HEP, add cervical rot with towel OP, move monitor to straight ahead, assess response.    Treatment Last 4 Visits  Treatment Day: 4       5/12/2025 5/16/2025 6/3/2025 6/6/2025   UE Treatment   Therapeutic Exercise Prone trunk ext  Scap ret  Cervical ret  Cervical RR  Self shoulder ext  Prone trunk ext UBE 6 min, L4  4# shoulder flex and abd to 90, 2x10 ea  Prone trunk ext 2x10  Scap ret  Cervical ret  Cervical RR w towel OP x 2 min  Self shoulder ext     2x10 (2,3 lbs DB) supine shoulder flexion arc to 90 deg    2x10 s/l shoulder ER  (no weight, 2 lbs DB)  2x10 s/l shoulder abduction (no weight, 2 lbs)    2x10 seated cervical retraction w/ lumbar support  2x10 SNAGs/cervical rot w/ towel    2x10 seated thoracic ext w/ half foam  2x10 scap retraction with ER (red) 60 sec prone on elbows  2x10 prone horizontal abduction/mid trap lifts    2x10 open book stretch    2x10 seated cervical retraction w/ lumbar support   2x10 SNAGs/cervical rot w/ towel      Neuro Re-Education Use of lumbar roll, postural ed x 35 min Assessed ergonomics at computer at home, 4 min     Manual Therapy Man R shoulder ext mobs gr 3 x 5 min, dec, B  Man R AC jt inf glide x 5 min, dec, B Prone thor ext mobs gr 3 x 3 min, tight  Supine man C tx x 3 min  Supine man RR/LR mobs x 2 min ea  Supine UT stretch x 2 min ea Bilat scalenes and cervical paraspinals, UT STM 18 min  5 min R shoulder PROM with sternoclavicular jt pinning Thoracic joint PAs ~5 min for about 30 sec bouts grade II-III    Bilat scalenes and cervical paraspinals, UT STM 18 min    Bilat first rib release MET   Therapeutic Exercise Minutes 10 28 23 19   Neuro Re-Educ Minutes 5 4     Manual Therapy Minutes 10 10 23 25   Evaluation Minutes 25      Total Time Of Timed Procedures 25 42 46 44   Total Time Of Service-Based Procedures 25 0 0 0   Total Treatment Time 50 42 46 44   HEP Prone trunk ext  Scap ret  Cervical ret  Cervical RR  Self shoulder ext  Prone trunk ext  Use of lumbar roll when seated           HEP  Prone trunk ext  Scap ret  Cervical ret  Cervical RR  Self shoulder ext  Prone trunk ext  Use of lumbar roll when seated    Charges  1 therex 2 manual

## 2025-06-10 ENCOUNTER — OFFICE VISIT (OUTPATIENT)
Dept: PHYSICAL THERAPY | Age: 55
End: 2025-06-10
Attending: STUDENT IN AN ORGANIZED HEALTH CARE EDUCATION/TRAINING PROGRAM
Payer: COMMERCIAL

## 2025-06-10 PROCEDURE — 97110 THERAPEUTIC EXERCISES: CPT

## 2025-06-10 PROCEDURE — 97140 MANUAL THERAPY 1/> REGIONS: CPT

## 2025-06-10 NOTE — PROGRESS NOTES
Patient: Indiana Coello (55 year old, female) Referring Provider:  Insurance:   Diagnosis: acute pain R shoulder [facility]   Date of Surgery: No data recorded Next MD visit:  N/A   Precautions:  None No data recorded Referral Information:    Date of Evaluation: Req: 8, Auth: 8, Exp: 8/31/2025    No data recorded POC Auth Visits:  8       Today's Date   6/10/2025    Subjective  Pt. reports it is ok and was alright over the weekend. Going to work flared it up to a 7/10.       Pain: 3/10     Objective  moderate restriction with supine cervical rotation            Assessment  Initiated session with manual and PROM to improve mobility and relieve tension. Pt. fatigued with RTC stabilization exercises, but did not express pain. She did have slight pain with abduction past 90 deg so instructed to modifiy range of motion so shoulder would not be irritated. Ended session with stretches since pt. Did not have opportunity to perform as often over the past weekend.    Goals (to be met in 8 visits)   Pt. will report decreased pain from 10/10 to 2/10 at worst.  ·Pt. will report no pain during pushing and pulling.  ·Increase active range of motion of cervical rotation to 70 degrees bilaterally to allow safe driving.    ·Increase strength of R shoulder to 5/5 to allow pt to perform work functions without pain.  ·Pt. will be able to tolerate lifting 30# for 10 repetitions minutes without increased symptoms.  ·Pt. will be able to perform ADLs with no pain.  ·Pt. will be independent with home exercise program and self management.                Plan  cont current HEP, add cervical rot with towel OP, move monitor to straight ahead, assess response.    Treatment Last 4 Visits  Treatment Day: 4       6/10/2025   Spine Treatment   Therapeutic Exercise 2x10 supine cervical rotations  2x10 supine short arc shoulder flexion 2 lbs  2x10 PNF D1/2 supine    S/L shoulder ER 2x10 using 2 lbs  S/l shoulder abduction 1x10 no  weight, 1x10 2 lbs    Prone on elbows 60 sec  Prone horizontal abduction  2x10  Prone low trap 2x10    2x10 cervical retractions  2x10 SNAG's into rotation seated   Manual Therapy Bilat scalenes and cervical paraspinals, UT STM 10 min   R shoulder PROM 5 min    Grade I-II S/C joint AP 2x30 sec    Therapeutic Exercise Minutes 26   Manual Therapy Minutes 16   Total Time Of Timed Procedures 42   Total Time Of Service-Based Procedures 0   Total Treatment Time 42        HEP       Charges  2 therex 1 manual

## 2025-06-12 ENCOUNTER — OFFICE VISIT (OUTPATIENT)
Dept: PHYSICAL THERAPY | Age: 55
End: 2025-06-12
Attending: STUDENT IN AN ORGANIZED HEALTH CARE EDUCATION/TRAINING PROGRAM
Payer: COMMERCIAL

## 2025-06-12 PROCEDURE — 97110 THERAPEUTIC EXERCISES: CPT | Performed by: PHYSICAL THERAPIST

## 2025-06-12 PROCEDURE — 97140 MANUAL THERAPY 1/> REGIONS: CPT | Performed by: PHYSICAL THERAPIST

## 2025-06-12 NOTE — PROGRESS NOTES
Patient: Indiana Coello (55 year old, female) Referring Provider:  Insurance:   Diagnosis: acute pain R shoulder [facility] Granger  [facility]   Date of Surgery: No data recorded Next MD visit:  N/A   Precautions:  None No data recorded Referral Information:    Date of Evaluation: Req: 8, Auth: 8, Exp: 8/31/2025    No data recorded POC Auth Visits:  8       Today's Date   6/12/2025    Subjective  pt states pain has moved more proximal to bilateral UT/neck, occ in R SC joint area.       Pain: 3/10     Objective  cervical RR: 45 with ERP,  L: 60 with ERP. RR improves to 50 deg with MWM, 60 degrees post manual techniques in supine.            Assessment  ROM progresses, tightens up between sessions. pt needs more frequent self mobs to maintain gains.    Goals (to be met in 8 visits)   Long term goals to be reached in 10 visits.  Pt. will report decreased pain from 10/10 to 2/10 at worst.  Pt. will report no pain during pushing and pulling.  Increase active range of motion of cervical rotation to 70 degrees bilaterally to allow safe driving.    Increase strength of R shoulder to 5/5 to allow pt to perform work functions without pain.  Pt. will be able to tolerate lifting 30# for 10 repetitions minutes without increased symptoms.  Pt. will be able to perform ADLs with no pain.  Pt. will be independent with home exercise program and self management.               Plan  cont frequent RR self mobs, increase to hourly as able, assess response.    Treatment Last 4 Visits  Treatment Day: 5       6/10/2025 6/12/2025   Spine Treatment   Therapeutic Exercise 2x10 supine cervical rotations  2x10 supine short arc shoulder flexion 2 lbs  2x10 PNF D1/2 supine    S/L shoulder ER 2x10 using 2 lbs  S/l shoulder abduction 1x10 no weight, 1x10 2 lbs    Prone on elbows 60 sec  Prone horizontal abduction  2x10  Prone low trap 2x10    2x10 cervical retractions  2x10 SNAG's into rotation seated UBE 6 min, L3  Seated pulleys flex x 3  min  Bicep curl/mil press 4# 3x10  Prone trunk ext x 10  Prone shoulder ext and abd x 10 ea  Supine shoulder flyes x 20  Supine shoulder press, circles 5# x 20 ea     Manual Therapy Bilat scalenes and cervical paraspinals, UT STM 10 min   R shoulder PROM 5 min    Grade I-II S/C joint AP 2x30 sec  Seated man tx/RR mobs gr 3 x 2 min, inc ROM  Seated SC jt inf mobs gr 3 x 2 min  Supine man tx, rot mobs, SB mobs, subocc release x 10 min: inc ROM  Prone thor ext mobs gr 3 x 2 min   Therapeutic Exercise Minutes 26 28   Manual Therapy Minutes 16 16   Total Time Of Timed Procedures 42 44   Total Time Of Service-Based Procedures 0 0   Total Treatment Time 42 44        HEP       Charges  TE2, MT1

## 2025-06-17 ENCOUNTER — TELEPHONE (OUTPATIENT)
Dept: PHYSICAL THERAPY | Facility: HOSPITAL | Age: 55
End: 2025-06-17

## 2025-06-18 ENCOUNTER — TELEPHONE (OUTPATIENT)
Dept: PHYSICAL THERAPY | Facility: HOSPITAL | Age: 55
End: 2025-06-18

## 2025-07-09 ENCOUNTER — OFFICE VISIT (OUTPATIENT)
Dept: PODIATRY CLINIC | Facility: CLINIC | Age: 55
End: 2025-07-09
Payer: COMMERCIAL

## 2025-07-09 DIAGNOSIS — M20.12 HALLUX VALGUS, LEFT: ICD-10-CM

## 2025-07-09 DIAGNOSIS — M72.2 PLANTAR FASCIITIS, LEFT: ICD-10-CM

## 2025-07-09 DIAGNOSIS — M20.11 HALLUX VALGUS, RIGHT: Primary | ICD-10-CM

## 2025-07-09 DIAGNOSIS — M72.2 PLANTAR FASCIITIS, RIGHT: ICD-10-CM

## 2025-07-09 PROCEDURE — 99213 OFFICE O/P EST LOW 20 MIN: CPT | Performed by: STUDENT IN AN ORGANIZED HEALTH CARE EDUCATION/TRAINING PROGRAM

## 2025-07-09 NOTE — PROGRESS NOTES
[facility] Podiatry  Progress Note    Indiana Coello is a 55 year old female.   Chief Complaint   Patient presents with    Foot Pain     Bilateral foot- bunion/plantar fascitis  f/u- also want to discuss custom orthotics         HPI:     Patient is a pleasant 55-year-old female presents to clinic for evaluation of multiple pedal complaints.  She has bunion deformity to both feet with the right worse than the left.  She does occasionally get some fifth toe pain from rubbing in her shoes.  She does find relief with [employer] tennis shoes.  Physical therapy has relieved her plantar fasciitis symptoms.  She presents today for follow-up and would like to discuss custom orthotics.        Allergies: Patient has no known allergies.   Current Outpatient Medications   Medication Sig Dispense Refill    levothyroxine 112 MCG Oral Tab Take 1 tablet (112 mcg total) by mouth before breakfast. 90 tablet 2    levothyroxine 112 MCG Oral Tab Take 1 tablet (112 mcg total) by mouth before breakfast. 30 tablet 0    Lovastatin 20 MG Oral Tab Take 1 tablet (20 mg total) by mouth daily. 90 tablet 1    LOSARTAN 100 MG Oral Tab TAKE 1 TABLET(100 MG) BY MOUTH DAILY 90 tablet 1    NON FORMULARY Per pt, she is taking steroids, does not know the name.      metroNIDAZOLE 0.75 % External Gel Apply 1 g topically 2 (two) times daily. Apply to face twice daily 45 g 5    COQ10 OR Take 1 tablet by mouth daily.      GARLIC OR Take 1 tablet by mouth daily.      CALCIUM OR Take 1 tablet by mouth daily as needed.        Past Medical History:    Anxiety, generalized    generalized anxiety the majority of her life. Anxiety attacks at work where she feels hot & can get hard to breathe.    Belching    Bilateral plantar fasciitis    has seen podiatrist    Bloating    Body piercing    Bunion    Diverticulosis of colon    noted on CT a/p done for LLQ pain     Dizziness    Fatigue    Female infertility due to ovulatory disorder    2 cycles of clomid at 100mg,  without success. +H/o infertility - ovulatory dysfunction. HSG normal in EMR but patient states \"tubes were too small\"    Fibroids    9/9/22 Pelvic US - 5.5 cm right fundal fibroid. Left uterine body fibroid 6.8 cm.     Flatulence/gas pain/belching    Frequent urination    Headache disorder    High cholesterol    History of depression    History of pelvic ultrasound    Done for LLQ pain. Two fibroids 4.5 cm & 2.9 cm - intramural/subserosal. Endometrium 8 mm. Ovaries normal. Small free fluid.     Hyperlipidemia    Hypothyroidism    Pap smear for cervical cancer screening    Pap & HPV negative. No abn pap.     Rosacea    sees derm    Stress    CHRISTOPHER (stress urinary incontinence, female)    Pelvic floor PT attended 10/2022 - 1/2023 for CHRISTOPHER, urinary frequency, pelvic floor dysfunction. CHRISTOPHER resolved. Feels she can empty bladder.    Tinnitus    bilateral, intermittent. Not following with ENT currently.     Wears glasses      Past Surgical History:   Procedure Laterality Date    Biopsy vulva/perineum,one lesn  07/05/2017    At [facility]ineal body - Path: spongiotic dermatitis (eczematous process.) Fungal stain neg. Dr. Rachel Gore    Hernia surgery N/A 3/16/2022    REPAIR UMBILICAL HERNIA - NO MESH. Yusef Raza MD. [facility] [place], [facility]    X-ray hysterosalpingogram  12/06/2012    Normal HSG for infertility. Erika. Dr. Mayfield      Family History   Problem Relation Age of Onset    Breast Cancer Mother 50    Thyroid disease Mother     Asthma Mother     Other (anxiety) Mother     Heart Disorder Father 60    Thyroid disease Maternal Grandmother     Stroke Maternal Grandmother     Diabetes Paternal Grandmother     Skin cancer Maternal Uncle         Not in contact currently     Ovarian Cancer Neg     Colon Cancer Neg     Colon Polyps Neg     Osteoporosis Neg     Birth Defects Neg     Genetic Disease Neg       Social History     Socioeconomic History    Marital status: Single   Tobacco Use    Smoking status: Never     Smokeless tobacco: Never   Vaping Use    Vaping status: Never Used   Substance and Sexual Activity    Alcohol use: Not Currently     Comment: Drink maybe once a month    Drug use: Never    Sexual activity: Not Currently     Birth control/protection: Abstinence   Other Topics Concern    Caffeine Concern Yes     Comment: too much gives me headaches    Weight Concern Yes     Comment: would love a weight lose plan           REVIEW OF SYSTEMS:     No n/v/f/c.      EXAM:   LMP 02/06/2025 (Approximate)   GENERAL: well developed, well nourished, in no apparent distress  EXTREMITIES:   1. Integument: Normal skin temperature and turgor  2. Vascular: Dorsalis pedis two out of four bilateral and posterior tibial pulses two out of   four bilateral, capillary refill normal.   3. Musculoskeletal: All muscle groups are graded 5 out of 5 in the foot and ankle.  Hallux valgus deformity noted bilaterally with the right worse than the left.  No major pain noted to sites.  Mildly diminished first MPJ range of motion.  Rectus foot type appreciated.  No pain on the plantar heels bilaterally.   4. Neurological: Normal sharp dull sensation; reflexes normal.          ASSESSMENT AND PLAN:   Diagnoses and all orders for this visit:    Hallux valgus, right    Plantar fasciitis, right    Plantar fasciitis, left    Hallux valgus, left          Plan:    -Patient examined, chart history reviewed.  -Discussed etiology of condition and various treatment options.  -X-rays obtained and reviewed.  Moderate to severe bunion right foot moderate bunion from her left foot.  Discussed treatment options including conservative and surgical treatment options.  Because patient is most without pain I would not recommend surgery at this time.  Discussed that pain and function should be guide when pursuing surgery.  Should ambulate with supportive shoes with wide toe box.  If symptoms worsen or fail to improve may consider Lapidus bunionectomy.  -Continue  stretching exercises and supportive shoes to prevent recurrence of plantar fasciitis.  -Will initiate authorization for custom inserts.  Patient would benefit from these given her activity level and unique foot type.  Can follow-up for orthotic casting if authorized.        The patient indicates understanding of these issues and agrees to the plan.        Leandro Byrne DPM    Dragon speech recognition software was used to prepare this note.  Errors in word recognition may occur.  Please contact me with any questions/concerns with this note.

## 2025-07-17 ENCOUNTER — APPOINTMENT (OUTPATIENT)
Dept: PHYSICAL THERAPY | Age: 55
End: 2025-07-17
Payer: COMMERCIAL

## 2025-07-29 ENCOUNTER — APPOINTMENT (OUTPATIENT)
Dept: PHYSICAL THERAPY | Age: 55
End: 2025-07-29
Payer: COMMERCIAL

## 2025-07-29 ENCOUNTER — OFFICE VISIT (OUTPATIENT)
Dept: PHYSICAL THERAPY | Age: 55
End: 2025-07-29
Attending: STUDENT IN AN ORGANIZED HEALTH CARE EDUCATION/TRAINING PROGRAM
Payer: COMMERCIAL

## 2025-07-29 PROCEDURE — 97140 MANUAL THERAPY 1/> REGIONS: CPT | Performed by: PHYSICAL THERAPIST

## 2025-07-29 PROCEDURE — 97110 THERAPEUTIC EXERCISES: CPT | Performed by: PHYSICAL THERAPIST

## 2025-07-31 ENCOUNTER — OFFICE VISIT (OUTPATIENT)
Dept: PHYSICAL THERAPY | Age: 55
End: 2025-07-31
Attending: STUDENT IN AN ORGANIZED HEALTH CARE EDUCATION/TRAINING PROGRAM
Payer: COMMERCIAL

## 2025-07-31 PROCEDURE — 97140 MANUAL THERAPY 1/> REGIONS: CPT | Performed by: PHYSICAL THERAPIST

## 2025-07-31 PROCEDURE — 97110 THERAPEUTIC EXERCISES: CPT | Performed by: PHYSICAL THERAPIST

## 2025-08-04 ENCOUNTER — OFFICE VISIT (OUTPATIENT)
Dept: PHYSICAL THERAPY | Age: 55
End: 2025-08-04
Attending: STUDENT IN AN ORGANIZED HEALTH CARE EDUCATION/TRAINING PROGRAM

## 2025-08-04 PROCEDURE — 97140 MANUAL THERAPY 1/> REGIONS: CPT | Performed by: PHYSICAL THERAPIST

## 2025-08-04 PROCEDURE — 97110 THERAPEUTIC EXERCISES: CPT | Performed by: PHYSICAL THERAPIST

## 2025-08-11 ENCOUNTER — OFFICE VISIT (OUTPATIENT)
Dept: FAMILY MEDICINE CLINIC | Facility: CLINIC | Age: 55
End: 2025-08-11

## 2025-08-11 VITALS
RESPIRATION RATE: 16 BRPM | HEIGHT: 62.5 IN | WEIGHT: 163 LBS | DIASTOLIC BLOOD PRESSURE: 88 MMHG | HEART RATE: 79 BPM | OXYGEN SATURATION: 96 % | SYSTOLIC BLOOD PRESSURE: 132 MMHG | BODY MASS INDEX: 29.25 KG/M2

## 2025-08-11 DIAGNOSIS — E78.2 MIXED HYPERLIPIDEMIA: Primary | ICD-10-CM

## 2025-08-11 DIAGNOSIS — Z12.31 ENCOUNTER FOR SCREENING MAMMOGRAM FOR MALIGNANT NEOPLASM OF BREAST: ICD-10-CM

## 2025-08-11 DIAGNOSIS — I10 PRIMARY HYPERTENSION: ICD-10-CM

## 2025-08-11 DIAGNOSIS — Z79.899 ENCOUNTER FOR MEDICATION REVIEW: ICD-10-CM

## 2025-08-11 DIAGNOSIS — K30 INDIGESTION: ICD-10-CM

## 2025-08-11 PROBLEM — N76.3 CHRONIC VULVITIS: Status: RESOLVED | Noted: 2022-04-07 | Resolved: 2025-08-11

## 2025-08-11 PROBLEM — R45.851 SUICIDAL IDEATION: Status: RESOLVED | Noted: 2022-05-24 | Resolved: 2025-08-11

## 2025-08-11 LAB
CHOLEST SERPL-MCNC: 159 MG/DL (ref ?–200)
FASTING PATIENT LIPID ANSWER: YES
HDLC SERPL-MCNC: 54 MG/DL (ref 40–59)
LDLC SERPL CALC-MCNC: 88 MG/DL (ref ?–100)
NONHDLC SERPL-MCNC: 105 MG/DL (ref ?–130)
TRIGL SERPL-MCNC: 91 MG/DL (ref 30–149)
VLDLC SERPL CALC-MCNC: 15 MG/DL (ref 0–30)

## 2025-08-11 PROCEDURE — 3079F DIAST BP 80-89 MM HG: CPT | Performed by: STUDENT IN AN ORGANIZED HEALTH CARE EDUCATION/TRAINING PROGRAM

## 2025-08-11 PROCEDURE — 3075F SYST BP GE 130 - 139MM HG: CPT | Performed by: STUDENT IN AN ORGANIZED HEALTH CARE EDUCATION/TRAINING PROGRAM

## 2025-08-11 PROCEDURE — 3008F BODY MASS INDEX DOCD: CPT | Performed by: STUDENT IN AN ORGANIZED HEALTH CARE EDUCATION/TRAINING PROGRAM

## 2025-08-11 PROCEDURE — 80061 LIPID PANEL: CPT | Performed by: STUDENT IN AN ORGANIZED HEALTH CARE EDUCATION/TRAINING PROGRAM

## 2025-08-11 PROCEDURE — 99213 OFFICE O/P EST LOW 20 MIN: CPT | Performed by: STUDENT IN AN ORGANIZED HEALTH CARE EDUCATION/TRAINING PROGRAM

## 2025-08-11 RX ORDER — LOVASTATIN 20 MG/1
20 TABLET ORAL DAILY
Qty: 90 TABLET | Refills: 1 | Status: CANCELLED | OUTPATIENT
Start: 2025-08-11

## 2025-08-11 RX ORDER — LOSARTAN POTASSIUM 100 MG/1
100 TABLET ORAL DAILY
Qty: 90 TABLET | Refills: 1 | Status: SHIPPED | OUTPATIENT
Start: 2025-08-11

## 2025-08-13 ENCOUNTER — APPOINTMENT (OUTPATIENT)
Dept: PHYSICAL THERAPY | Age: 55
End: 2025-08-13

## 2025-08-15 DIAGNOSIS — E78.2 MIXED HYPERLIPIDEMIA: ICD-10-CM

## 2025-08-15 RX ORDER — LOVASTATIN 20 MG/1
20 TABLET ORAL DAILY
Qty: 90 TABLET | Refills: 1 | Status: SHIPPED | OUTPATIENT
Start: 2025-08-15

## 2025-08-18 ENCOUNTER — OFFICE VISIT (OUTPATIENT)
Dept: PODIATRY CLINIC | Facility: CLINIC | Age: 55
End: 2025-08-18

## 2025-08-18 DIAGNOSIS — M20.11 HALLUX VALGUS, RIGHT: Primary | ICD-10-CM

## 2025-08-18 DIAGNOSIS — M72.2 PLANTAR FASCIITIS, LEFT: ICD-10-CM

## 2025-08-18 DIAGNOSIS — M20.12 HALLUX VALGUS, LEFT: ICD-10-CM

## 2025-08-18 DIAGNOSIS — M72.2 PLANTAR FASCIITIS, RIGHT: ICD-10-CM

## 2025-08-22 ENCOUNTER — TELEPHONE (OUTPATIENT)
Dept: PHYSICAL THERAPY | Facility: HOSPITAL | Age: 55
End: 2025-08-22